# Patient Record
Sex: MALE | Race: WHITE | NOT HISPANIC OR LATINO | Employment: FULL TIME | URBAN - METROPOLITAN AREA
[De-identification: names, ages, dates, MRNs, and addresses within clinical notes are randomized per-mention and may not be internally consistent; named-entity substitution may affect disease eponyms.]

---

## 2018-01-01 ENCOUNTER — APPOINTMENT (OUTPATIENT)
Dept: RADIOLOGY | Facility: MEDICAL CENTER | Age: 63
DRG: 853 | End: 2018-01-01
Attending: INTERNAL MEDICINE
Payer: COMMERCIAL

## 2018-01-01 ENCOUNTER — HOSPITAL ENCOUNTER (INPATIENT)
Facility: MEDICAL CENTER | Age: 63
LOS: 3 days | DRG: 853 | End: 2018-02-18
Attending: EMERGENCY MEDICINE | Admitting: HOSPITALIST
Payer: COMMERCIAL

## 2018-01-01 ENCOUNTER — APPOINTMENT (OUTPATIENT)
Dept: RADIOLOGY | Facility: MEDICAL CENTER | Age: 63
DRG: 853 | End: 2018-01-01
Attending: EMERGENCY MEDICINE
Payer: COMMERCIAL

## 2018-01-01 VITALS
RESPIRATION RATE: 27 BRPM | DIASTOLIC BLOOD PRESSURE: 65 MMHG | HEIGHT: 73 IN | OXYGEN SATURATION: 97 % | WEIGHT: 211.86 LBS | TEMPERATURE: 98.1 F | BODY MASS INDEX: 28.08 KG/M2 | HEART RATE: 117 BPM | SYSTOLIC BLOOD PRESSURE: 134 MMHG

## 2018-01-01 DIAGNOSIS — N17.9 ACUTE KIDNEY INJURY (HCC): ICD-10-CM

## 2018-01-01 DIAGNOSIS — E87.5 HYPERKALEMIA: ICD-10-CM

## 2018-01-01 DIAGNOSIS — J96.01 ACUTE RESPIRATORY FAILURE WITH HYPOXIA (HCC): ICD-10-CM

## 2018-01-01 DIAGNOSIS — R41.82 ALTERED MENTAL STATUS, UNSPECIFIED ALTERED MENTAL STATUS TYPE: ICD-10-CM

## 2018-01-01 DIAGNOSIS — C34.92 MALIGNANT NEOPLASM OF LEFT LUNG, UNSPECIFIED PART OF LUNG (HCC): ICD-10-CM

## 2018-01-01 DIAGNOSIS — A41.9 SEPSIS, DUE TO UNSPECIFIED ORGANISM: ICD-10-CM

## 2018-01-01 LAB
ACTION RANGE TRIGGERED IACRT: NO
ACTION RANGE TRIGGERED IACRT: YES
ACTION RANGE TRIGGERED IACRT: YES
ALBUMIN SERPL BCP-MCNC: 3.3 G/DL (ref 3.2–4.9)
ALBUMIN/GLOB SERPL: 0.6 G/DL
ALP SERPL-CCNC: 198 U/L (ref 30–99)
ALT SERPL-CCNC: 32 U/L (ref 2–50)
AMPHET UR QL SCN: NEGATIVE
ANION GAP SERPL CALC-SCNC: 10 MMOL/L (ref 0–11.9)
ANION GAP SERPL CALC-SCNC: 11 MMOL/L (ref 0–11.9)
ANION GAP SERPL CALC-SCNC: 11 MMOL/L (ref 0–11.9)
ANION GAP SERPL CALC-SCNC: 15 MMOL/L (ref 0–11.9)
ANION GAP SERPL CALC-SCNC: 9 MMOL/L (ref 0–11.9)
ANISOCYTOSIS BLD QL SMEAR: ABNORMAL
APPEARANCE UR: CLEAR
AST SERPL-CCNC: 70 U/L (ref 12–45)
BACTERIA #/AREA URNS HPF: NEGATIVE /HPF
BACTERIA SPEC RESP CULT: ABNORMAL
BACTERIA SPEC RESP CULT: ABNORMAL
BACTERIA UR CULT: NORMAL
BARBITURATES UR QL SCN: NEGATIVE
BASE EXCESS BLDA CALC-SCNC: -13 MMOL/L (ref -4–3)
BASE EXCESS BLDA CALC-SCNC: -4 MMOL/L (ref -4–3)
BASE EXCESS BLDA CALC-SCNC: -6 MMOL/L (ref -4–3)
BASE EXCESS BLDA CALC-SCNC: -7 MMOL/L (ref -4–3)
BASE EXCESS BLDA CALC-SCNC: -9 MMOL/L (ref -4–3)
BASOPHILS # BLD AUTO: 0.2 % (ref 0–1.8)
BASOPHILS # BLD AUTO: 0.3 % (ref 0–1.8)
BASOPHILS # BLD AUTO: 0.4 % (ref 0–1.8)
BASOPHILS # BLD AUTO: 0.5 % (ref 0–1.8)
BASOPHILS # BLD AUTO: 0.5 % (ref 0–1.8)
BASOPHILS # BLD AUTO: 1.8 % (ref 0–1.8)
BASOPHILS # BLD: 0.03 K/UL (ref 0–0.12)
BASOPHILS # BLD: 0.03 K/UL (ref 0–0.12)
BASOPHILS # BLD: 0.04 K/UL (ref 0–0.12)
BASOPHILS # BLD: 0.06 K/UL (ref 0–0.12)
BASOPHILS # BLD: 0.06 K/UL (ref 0–0.12)
BASOPHILS # BLD: 0.27 K/UL (ref 0–0.12)
BENZODIAZ UR QL SCN: NEGATIVE
BILIRUB SERPL-MCNC: 0.4 MG/DL (ref 0.1–1.5)
BILIRUB UR QL STRIP.AUTO: NEGATIVE
BODY TEMPERATURE: ABNORMAL CENTIGRADE
BODY TEMPERATURE: ABNORMAL DEGREES
BUN SERPL-MCNC: 51 MG/DL (ref 8–22)
BUN SERPL-MCNC: 60 MG/DL (ref 8–22)
BUN SERPL-MCNC: 64 MG/DL (ref 8–22)
BUN SERPL-MCNC: 65 MG/DL (ref 8–22)
BUN SERPL-MCNC: 65 MG/DL (ref 8–22)
BURR CELLS BLD QL SMEAR: NORMAL
BZE UR QL SCN: NEGATIVE
CALCIUM SERPL-MCNC: 8.1 MG/DL (ref 8.5–10.5)
CALCIUM SERPL-MCNC: 9.2 MG/DL (ref 8.5–10.5)
CALCIUM SERPL-MCNC: 9.7 MG/DL (ref 8.5–10.5)
CANNABINOIDS UR QL SCN: POSITIVE
CFT BLD TEG: 5.5 MIN (ref 5–10)
CFT P HPASE BLD TEG: 5.9 MIN (ref 5–10)
CHLORIDE SERPL-SCNC: 103 MMOL/L (ref 96–112)
CHLORIDE SERPL-SCNC: 109 MMOL/L (ref 96–112)
CHLORIDE SERPL-SCNC: 110 MMOL/L (ref 96–112)
CHLORIDE SERPL-SCNC: 111 MMOL/L (ref 96–112)
CHLORIDE SERPL-SCNC: 113 MMOL/L (ref 96–112)
CHOLEST SERPL-MCNC: 121 MG/DL (ref 100–199)
CLOT ANGLE BLD TEG: 78.6 DEGREES (ref 53–72)
CLOT ANGLE P HPASE BLD TEG: 77 DEGREES (ref 53–72)
CLOT INIT P HPASE BLD TEG: 1 MIN (ref 1–3)
CLOT LYSIS 30M P MA LENFR BLD TEG: 0 % (ref 0–8)
CLOT LYSIS 30M P MA LENFR BLD TEG: 0 % (ref 0–8)
CO2 BLDA-SCNC: 16 MMOL/L (ref 20–33)
CO2 BLDA-SCNC: 18 MMOL/L (ref 20–33)
CO2 BLDA-SCNC: 19 MMOL/L (ref 20–33)
CO2 BLDA-SCNC: 21 MMOL/L (ref 20–33)
CO2 SERPL-SCNC: 15 MMOL/L (ref 20–33)
CO2 SERPL-SCNC: 17 MMOL/L (ref 20–33)
CO2 SERPL-SCNC: 18 MMOL/L (ref 20–33)
CO2 SERPL-SCNC: 18 MMOL/L (ref 20–33)
CO2 SERPL-SCNC: 19 MMOL/L (ref 20–33)
COLOR UR: YELLOW
CREAT SERPL-MCNC: 2.3 MG/DL (ref 0.5–1.4)
CREAT SERPL-MCNC: 2.69 MG/DL (ref 0.5–1.4)
CREAT SERPL-MCNC: 3.17 MG/DL (ref 0.5–1.4)
CREAT SERPL-MCNC: 3.49 MG/DL (ref 0.5–1.4)
CREAT SERPL-MCNC: 3.5 MG/DL (ref 0.5–1.4)
CREAT UR-MCNC: 146.2 MG/DL
CT.EXTRINSIC BLD ROTEM: 0.9 MIN (ref 1–3)
DACRYOCYTES BLD QL SMEAR: NORMAL
EKG IMPRESSION: NORMAL
EOSINOPHIL # BLD AUTO: 0.08 K/UL (ref 0–0.51)
EOSINOPHIL # BLD AUTO: 0.09 K/UL (ref 0–0.51)
EOSINOPHIL # BLD AUTO: 0.13 K/UL (ref 0–0.51)
EOSINOPHIL # BLD AUTO: 0.18 K/UL (ref 0–0.51)
EOSINOPHIL # BLD AUTO: 0.39 K/UL (ref 0–0.51)
EOSINOPHIL # BLD AUTO: 0.41 K/UL (ref 0–0.51)
EOSINOPHIL NFR BLD: 0.6 % (ref 0–6.9)
EOSINOPHIL NFR BLD: 0.6 % (ref 0–6.9)
EOSINOPHIL NFR BLD: 1.3 % (ref 0–6.9)
EOSINOPHIL NFR BLD: 1.8 % (ref 0–6.9)
EOSINOPHIL NFR BLD: 2.6 % (ref 0–6.9)
EOSINOPHIL NFR BLD: 3.6 % (ref 0–6.9)
EPI CELLS #/AREA URNS HPF: NEGATIVE /HPF
ERYTHROCYTE [DISTWIDTH] IN BLOOD BY AUTOMATED COUNT: 46.4 FL (ref 35.9–50)
ERYTHROCYTE [DISTWIDTH] IN BLOOD BY AUTOMATED COUNT: 48 FL (ref 35.9–50)
ERYTHROCYTE [DISTWIDTH] IN BLOOD BY AUTOMATED COUNT: 48.1 FL (ref 35.9–50)
ERYTHROCYTE [DISTWIDTH] IN BLOOD BY AUTOMATED COUNT: 48.2 FL (ref 35.9–50)
ERYTHROCYTE [DISTWIDTH] IN BLOOD BY AUTOMATED COUNT: 48.9 FL (ref 35.9–50)
ERYTHROCYTE [DISTWIDTH] IN BLOOD BY AUTOMATED COUNT: 49.1 FL (ref 35.9–50)
EST. AVERAGE GLUCOSE BLD GHB EST-MCNC: 146 MG/DL
GLOBULIN SER CALC-MCNC: 5.1 G/DL (ref 1.9–3.5)
GLUCOSE BLD-MCNC: 117 MG/DL (ref 65–99)
GLUCOSE BLD-MCNC: 130 MG/DL (ref 65–99)
GLUCOSE BLD-MCNC: 140 MG/DL (ref 65–99)
GLUCOSE BLD-MCNC: 146 MG/DL (ref 65–99)
GLUCOSE BLD-MCNC: 162 MG/DL (ref 65–99)
GLUCOSE BLD-MCNC: 173 MG/DL (ref 65–99)
GLUCOSE BLD-MCNC: 178 MG/DL (ref 65–99)
GLUCOSE BLD-MCNC: 212 MG/DL (ref 65–99)
GLUCOSE BLD-MCNC: 220 MG/DL (ref 65–99)
GLUCOSE SERPL-MCNC: 124 MG/DL (ref 65–99)
GLUCOSE SERPL-MCNC: 127 MG/DL (ref 65–99)
GLUCOSE SERPL-MCNC: 177 MG/DL (ref 65–99)
GLUCOSE SERPL-MCNC: 201 MG/DL (ref 65–99)
GLUCOSE SERPL-MCNC: 224 MG/DL (ref 65–99)
GLUCOSE UR STRIP.AUTO-MCNC: NEGATIVE MG/DL
GRAM STN SPEC: ABNORMAL
GRAM STN SPEC: NORMAL
GRAM STN SPEC: NORMAL
HBA1C MFR BLD: 6.7 % (ref 0–5.6)
HCO3 BLDA-SCNC: 14.8 MMOL/L (ref 17–25)
HCO3 BLDA-SCNC: 17.1 MMOL/L (ref 17–25)
HCO3 BLDA-SCNC: 17.7 MMOL/L (ref 17–25)
HCO3 BLDA-SCNC: 17.7 MMOL/L (ref 17–25)
HCO3 BLDA-SCNC: 18.5 MMOL/L (ref 17–25)
HCO3 BLDA-SCNC: 19 MMOL/L (ref 17–25)
HCO3 BLDA-SCNC: 20 MMOL/L (ref 17–25)
HCT VFR BLD AUTO: 26 % (ref 42–52)
HCT VFR BLD AUTO: 27.4 % (ref 42–52)
HCT VFR BLD AUTO: 27.7 % (ref 42–52)
HCT VFR BLD AUTO: 29.4 % (ref 42–52)
HCT VFR BLD AUTO: 35.2 % (ref 42–52)
HCT VFR BLD AUTO: 35.8 % (ref 42–52)
HDLC SERPL-MCNC: 17 MG/DL
HGB BLD-MCNC: 10.2 G/DL (ref 14–18)
HGB BLD-MCNC: 11.1 G/DL (ref 14–18)
HGB BLD-MCNC: 7.6 G/DL (ref 14–18)
HGB BLD-MCNC: 7.9 G/DL (ref 14–18)
HGB BLD-MCNC: 8.5 G/DL (ref 14–18)
HGB BLD-MCNC: 8.7 G/DL (ref 14–18)
HYALINE CASTS #/AREA URNS LPF: ABNORMAL /LPF
IMM GRANULOCYTES # BLD AUTO: 0.03 K/UL (ref 0–0.11)
IMM GRANULOCYTES # BLD AUTO: 0.06 K/UL (ref 0–0.11)
IMM GRANULOCYTES # BLD AUTO: 0.07 K/UL (ref 0–0.11)
IMM GRANULOCYTES # BLD AUTO: 0.08 K/UL (ref 0–0.11)
IMM GRANULOCYTES # BLD AUTO: 0.09 K/UL (ref 0–0.11)
IMM GRANULOCYTES NFR BLD AUTO: 0.3 % (ref 0–0.9)
IMM GRANULOCYTES NFR BLD AUTO: 0.5 % (ref 0–0.9)
IMM GRANULOCYTES NFR BLD AUTO: 0.5 % (ref 0–0.9)
IMM GRANULOCYTES NFR BLD AUTO: 0.6 % (ref 0–0.9)
IMM GRANULOCYTES NFR BLD AUTO: 0.8 % (ref 0–0.9)
INST. QUALIFIED PATIENT IIQPT: YES
KETONES UR STRIP.AUTO-MCNC: NEGATIVE MG/DL
LACTATE BLD-SCNC: 1.4 MMOL/L (ref 0.5–2)
LACTATE BLD-SCNC: 1.7 MMOL/L (ref 0.5–2)
LACTATE BLD-SCNC: 2.2 MMOL/L (ref 0.5–2)
LDLC SERPL CALC-MCNC: 69 MG/DL
LEUKOCYTE ESTERASE UR QL STRIP.AUTO: NEGATIVE
LYMPHOCYTES # BLD AUTO: 0.9 K/UL (ref 1–4.8)
LYMPHOCYTES # BLD AUTO: 0.94 K/UL (ref 1–4.8)
LYMPHOCYTES # BLD AUTO: 0.96 K/UL (ref 1–4.8)
LYMPHOCYTES # BLD AUTO: 1.14 K/UL (ref 1–4.8)
LYMPHOCYTES # BLD AUTO: 1.14 K/UL (ref 1–4.8)
LYMPHOCYTES # BLD AUTO: 1.83 K/UL (ref 1–4.8)
LYMPHOCYTES NFR BLD: 11.1 % (ref 22–41)
LYMPHOCYTES NFR BLD: 12.2 % (ref 22–41)
LYMPHOCYTES NFR BLD: 6.2 % (ref 22–41)
LYMPHOCYTES NFR BLD: 8.2 % (ref 22–41)
LYMPHOCYTES NFR BLD: 8.7 % (ref 22–41)
LYMPHOCYTES NFR BLD: 9.8 % (ref 22–41)
MAGNESIUM SERPL-MCNC: 1.9 MG/DL (ref 1.5–2.5)
MAGNESIUM SERPL-MCNC: 1.9 MG/DL (ref 1.5–2.5)
MAGNESIUM SERPL-MCNC: 2.1 MG/DL (ref 1.5–2.5)
MANUAL DIFF BLD: NORMAL
MCF BLD TEG: 86.8 MM (ref 50–70)
MCF P HPASE BLD TEG: 85.9 MM (ref 50–70)
MCH RBC QN AUTO: 23.4 PG (ref 27–33)
MCH RBC QN AUTO: 23.5 PG (ref 27–33)
MCH RBC QN AUTO: 23.7 PG (ref 27–33)
MCH RBC QN AUTO: 24.1 PG (ref 27–33)
MCH RBC QN AUTO: 24.2 PG (ref 27–33)
MCH RBC QN AUTO: 24.2 PG (ref 27–33)
MCHC RBC AUTO-ENTMCNC: 28.8 G/DL (ref 33.7–35.3)
MCHC RBC AUTO-ENTMCNC: 29.2 G/DL (ref 33.7–35.3)
MCHC RBC AUTO-ENTMCNC: 29.6 G/DL (ref 33.7–35.3)
MCHC RBC AUTO-ENTMCNC: 29.9 G/DL (ref 33.7–35.3)
MCHC RBC AUTO-ENTMCNC: 30.7 G/DL (ref 33.7–35.3)
MCHC RBC AUTO-ENTMCNC: 31 G/DL (ref 33.7–35.3)
MCV RBC AUTO: 77.8 FL (ref 81.4–97.8)
MCV RBC AUTO: 78.9 FL (ref 81.4–97.8)
MCV RBC AUTO: 80.1 FL (ref 81.4–97.8)
MCV RBC AUTO: 80.2 FL (ref 81.4–97.8)
MCV RBC AUTO: 80.9 FL (ref 81.4–97.8)
MCV RBC AUTO: 81.1 FL (ref 81.4–97.8)
METHADONE UR QL SCN: NEGATIVE
MICRO URNS: ABNORMAL
MICROCYTES BLD QL SMEAR: ABNORMAL
MONOCYTES # BLD AUTO: 0.78 K/UL (ref 0–0.85)
MONOCYTES # BLD AUTO: 0.83 K/UL (ref 0–0.85)
MONOCYTES # BLD AUTO: 0.84 K/UL (ref 0–0.85)
MONOCYTES # BLD AUTO: 0.86 K/UL (ref 0–0.85)
MONOCYTES # BLD AUTO: 0.86 K/UL (ref 0–0.85)
MONOCYTES # BLD AUTO: 0.9 K/UL (ref 0–0.85)
MONOCYTES NFR BLD AUTO: 5.2 % (ref 0–13.4)
MONOCYTES NFR BLD AUTO: 5.9 % (ref 0–13.4)
MONOCYTES NFR BLD AUTO: 6.6 % (ref 0–13.4)
MONOCYTES NFR BLD AUTO: 7.3 % (ref 0–13.4)
MONOCYTES NFR BLD AUTO: 8.5 % (ref 0–13.4)
MONOCYTES NFR BLD AUTO: 8.8 % (ref 0–13.4)
MORPHOLOGY BLD-IMP: NORMAL
NEUTROPHILS # BLD AUTO: 10.82 K/UL (ref 1.82–7.42)
NEUTROPHILS # BLD AUTO: 11.73 K/UL (ref 1.82–7.42)
NEUTROPHILS # BLD AUTO: 12.65 K/UL (ref 1.82–7.42)
NEUTROPHILS # BLD AUTO: 7.72 K/UL (ref 1.82–7.42)
NEUTROPHILS # BLD AUTO: 8.03 K/UL (ref 1.82–7.42)
NEUTROPHILS # BLD AUTO: 9.12 K/UL (ref 1.82–7.42)
NEUTROPHILS NFR BLD: 76.5 % (ref 44–72)
NEUTROPHILS NFR BLD: 78.1 % (ref 44–72)
NEUTROPHILS NFR BLD: 79 % (ref 44–72)
NEUTROPHILS NFR BLD: 79.6 % (ref 44–72)
NEUTROPHILS NFR BLD: 83.1 % (ref 44–72)
NEUTROPHILS NFR BLD: 86.6 % (ref 44–72)
NEUTS BAND NFR BLD MANUAL: 1.7 % (ref 0–10)
NITRITE UR QL STRIP.AUTO: NEGATIVE
NRBC # BLD AUTO: 0 K/UL
NRBC BLD-RTO: 0 /100 WBC
O2/TOTAL GAS SETTING VFR VENT: 100 %
O2/TOTAL GAS SETTING VFR VENT: 100 %
O2/TOTAL GAS SETTING VFR VENT: 40 %
O2/TOTAL GAS SETTING VFR VENT: 50 %
O2/TOTAL GAS SETTING VFR VENT: 80 %
O2/TOTAL GAS SETTING VFR VENT: 80 %
OPIATES UR QL SCN: POSITIVE
OXYCODONE UR QL SCN: POSITIVE
PCO2 BLDA: 28.6 MMHG (ref 26–37)
PCO2 BLDA: 30.8 MMHG (ref 26–37)
PCO2 BLDA: 31.5 MMHG (ref 26–37)
PCO2 BLDA: 34 MMHG (ref 26–37)
PCO2 BLDA: 36.1 MMHG (ref 26–37)
PCO2 BLDA: 38.6 MMHG (ref 26–37)
PCO2 BLDA: 38.8 MMHG (ref 26–37)
PCO2 TEMP ADJ BLDA: 28.3 MMHG (ref 26–37)
PCO2 TEMP ADJ BLDA: 31.3 MMHG (ref 26–37)
PCO2 TEMP ADJ BLDA: 33 MMHG (ref 26–37)
PCO2 TEMP ADJ BLDA: 36.6 MMHG (ref 26–37)
PCO2 TEMP ADJ BLDA: 41.7 MMHG (ref 26–37)
PCP UR QL SCN: NEGATIVE
PH BLDA: 7.19 [PH] (ref 7.4–7.5)
PH BLDA: 7.27 [PH] (ref 7.4–7.5)
PH BLDA: 7.34 [PH] (ref 7.4–7.5)
PH BLDA: 7.34 [PH] (ref 7.4–7.5)
PH BLDA: 7.37 [PH] (ref 7.4–7.5)
PH BLDA: 7.38 [PH] (ref 7.4–7.5)
PH BLDA: 7.41 [PH] (ref 7.4–7.5)
PH TEMP ADJ BLDA: 7.17 [PH] (ref 7.4–7.5)
PH TEMP ADJ BLDA: 7.32 [PH] (ref 7.4–7.5)
PH TEMP ADJ BLDA: 7.36 [PH] (ref 7.4–7.5)
PH TEMP ADJ BLDA: 7.39 [PH] (ref 7.4–7.5)
PH TEMP ADJ BLDA: 7.39 [PH] (ref 7.4–7.5)
PH UR STRIP.AUTO: 5 [PH]
PHOSPHATE SERPL-MCNC: 3.7 MG/DL (ref 2.5–4.5)
PHOSPHATE SERPL-MCNC: 4.5 MG/DL (ref 2.5–4.5)
PHOSPHATE SERPL-MCNC: 5.2 MG/DL (ref 2.5–4.5)
PLATELET # BLD AUTO: 202 K/UL (ref 164–446)
PLATELET # BLD AUTO: 204 K/UL (ref 164–446)
PLATELET # BLD AUTO: 208 K/UL (ref 164–446)
PLATELET # BLD AUTO: 232 K/UL (ref 164–446)
PLATELET # BLD AUTO: 282 K/UL (ref 164–446)
PLATELET # BLD AUTO: 305 K/UL (ref 164–446)
PMV BLD AUTO: 10.9 FL (ref 9–12.9)
PMV BLD AUTO: 11 FL (ref 9–12.9)
PMV BLD AUTO: 11.2 FL (ref 9–12.9)
PMV BLD AUTO: 11.3 FL (ref 9–12.9)
PMV BLD AUTO: 11.3 FL (ref 9–12.9)
PMV BLD AUTO: 11.4 FL (ref 9–12.9)
PO2 BLDA: 118 MMHG (ref 64–87)
PO2 BLDA: 123 MMHG (ref 64–87)
PO2 BLDA: 130 MMHG (ref 64–87)
PO2 BLDA: 175 MMHG (ref 64–87)
PO2 BLDA: 70 MMHG (ref 64–87)
PO2 BLDA: 78 MMHG (ref 64–87)
PO2 BLDA: 80.5 MMHG (ref 64–87)
PO2 TEMP ADJ BLDA: 128 MMHG (ref 64–87)
PO2 TEMP ADJ BLDA: 135 MMHG (ref 64–87)
PO2 TEMP ADJ BLDA: 177 MMHG (ref 64–87)
PO2 TEMP ADJ BLDA: 79 MMHG (ref 64–87)
PO2 TEMP ADJ BLDA: 84 MMHG (ref 64–87)
POIKILOCYTOSIS BLD QL SMEAR: NORMAL
POLYCHROMASIA BLD QL SMEAR: NORMAL
POTASSIUM SERPL-SCNC: 4.5 MMOL/L (ref 3.6–5.5)
POTASSIUM SERPL-SCNC: 4.8 MMOL/L (ref 3.6–5.5)
POTASSIUM SERPL-SCNC: 4.9 MMOL/L (ref 3.6–5.5)
POTASSIUM SERPL-SCNC: 5.3 MMOL/L (ref 3.6–5.5)
POTASSIUM SERPL-SCNC: 5.8 MMOL/L (ref 3.6–5.5)
PROCALCITONIN SERPL-MCNC: 0.55 NG/ML
PROPOXYPH UR QL SCN: NEGATIVE
PROT SERPL-MCNC: 8.4 G/DL (ref 6–8.2)
PROT UR QL STRIP: NEGATIVE MG/DL
RBC # BLD AUTO: 3.24 M/UL (ref 4.7–6.1)
RBC # BLD AUTO: 3.38 M/UL (ref 4.7–6.1)
RBC # BLD AUTO: 3.51 M/UL (ref 4.7–6.1)
RBC # BLD AUTO: 3.67 M/UL (ref 4.7–6.1)
RBC # BLD AUTO: 4.3 M/UL (ref 4.7–6.1)
RBC # BLD AUTO: 4.6 M/UL (ref 4.7–6.1)
RBC # URNS HPF: ABNORMAL /HPF
RBC BLD AUTO: PRESENT
RBC UR QL AUTO: ABNORMAL
RHODAMINE-AURAMINE STN SPEC: NORMAL
SALICYLATES SERPL-MCNC: 0 MG/DL (ref 15–25)
SAO2 % BLDA: 100 % (ref 93–99)
SAO2 % BLDA: 89 % (ref 93–99)
SAO2 % BLDA: 93.3 % (ref 93–99)
SAO2 % BLDA: 96 % (ref 93–99)
SAO2 % BLDA: 98 % (ref 93–99)
SAO2 % BLDA: 99 % (ref 93–99)
SAO2 % BLDA: 99 % (ref 93–99)
SIGNIFICANT IND 70042: ABNORMAL
SIGNIFICANT IND 70042: NORMAL
SITE SITE: ABNORMAL
SITE SITE: NORMAL
SODIUM SERPL-SCNC: 135 MMOL/L (ref 135–145)
SODIUM SERPL-SCNC: 136 MMOL/L (ref 135–145)
SODIUM SERPL-SCNC: 139 MMOL/L (ref 135–145)
SODIUM SERPL-SCNC: 139 MMOL/L (ref 135–145)
SODIUM SERPL-SCNC: 140 MMOL/L (ref 135–145)
SODIUM UR-SCNC: 49 MMOL/L
SOURCE SOURCE: ABNORMAL
SOURCE SOURCE: NORMAL
SP GR UR STRIP.AUTO: 1.02
SPECIMEN DRAWN FROM PATIENT: ABNORMAL
TEG ALGORITHM TGALG: ABNORMAL
TRIGL SERPL-MCNC: 168 MG/DL (ref 0–149)
TRIGL SERPL-MCNC: 175 MG/DL (ref 0–149)
UROBILINOGEN UR STRIP.AUTO-MCNC: 1 MG/DL
VANCOMYCIN SERPL-MCNC: 13.8 UG/ML
VANCOMYCIN SERPL-MCNC: 18.9 UG/ML
WBC # BLD AUTO: 10.3 K/UL (ref 4.8–10.8)
WBC # BLD AUTO: 11.5 K/UL (ref 4.8–10.8)
WBC # BLD AUTO: 13 K/UL (ref 4.8–10.8)
WBC # BLD AUTO: 14.6 K/UL (ref 4.8–10.8)
WBC # BLD AUTO: 15 K/UL (ref 4.8–10.8)
WBC # BLD AUTO: 9.8 K/UL (ref 4.8–10.8)
WBC #/AREA URNS HPF: ABNORMAL /HPF

## 2018-01-01 PROCEDURE — 96366 THER/PROPH/DIAG IV INF ADDON: CPT

## 2018-01-01 PROCEDURE — 36600 WITHDRAWAL OF ARTERIAL BLOOD: CPT

## 2018-01-01 PROCEDURE — 303105 HCHG CATHETER EXTRA

## 2018-01-01 PROCEDURE — 70450 CT HEAD/BRAIN W/O DYE: CPT

## 2018-01-01 PROCEDURE — 71045 X-RAY EXAM CHEST 1 VIEW: CPT

## 2018-01-01 PROCEDURE — 85025 COMPLETE CBC W/AUTO DIFF WBC: CPT

## 2018-01-01 PROCEDURE — 96375 TX/PRO/DX INJ NEW DRUG ADDON: CPT

## 2018-01-01 PROCEDURE — 93005 ELECTROCARDIOGRAM TRACING: CPT | Performed by: EMERGENCY MEDICINE

## 2018-01-01 PROCEDURE — 700111 HCHG RX REV CODE 636 W/ 250 OVERRIDE (IP): Performed by: INTERNAL MEDICINE

## 2018-01-01 PROCEDURE — 82962 GLUCOSE BLOOD TEST: CPT

## 2018-01-01 PROCEDURE — 83735 ASSAY OF MAGNESIUM: CPT

## 2018-01-01 PROCEDURE — 87102 FUNGUS ISOLATION CULTURE: CPT

## 2018-01-01 PROCEDURE — A9270 NON-COVERED ITEM OR SERVICE: HCPCS | Performed by: INTERNAL MEDICINE

## 2018-01-01 PROCEDURE — 51702 INSERT TEMP BLADDER CATH: CPT

## 2018-01-01 PROCEDURE — 96368 THER/DIAG CONCURRENT INF: CPT

## 2018-01-01 PROCEDURE — 700111 HCHG RX REV CODE 636 W/ 250 OVERRIDE (IP): Performed by: PHARMACIST

## 2018-01-01 PROCEDURE — 700105 HCHG RX REV CODE 258: Performed by: INTERNAL MEDICINE

## 2018-01-01 PROCEDURE — 85027 COMPLETE CBC AUTOMATED: CPT

## 2018-01-01 PROCEDURE — B548ZZA ULTRASONOGRAPHY OF SUPERIOR VENA CAVA, GUIDANCE: ICD-10-PCS | Performed by: INTERNAL MEDICINE

## 2018-01-01 PROCEDURE — 0B938ZZ DRAINAGE OF RIGHT MAIN BRONCHUS, VIA NATURAL OR ARTIFICIAL OPENING ENDOSCOPIC: ICD-10-PCS | Performed by: INTERNAL MEDICINE

## 2018-01-01 PROCEDURE — 94003 VENT MGMT INPAT SUBQ DAY: CPT

## 2018-01-01 PROCEDURE — 87205 SMEAR GRAM STAIN: CPT

## 2018-01-01 PROCEDURE — 93005 ELECTROCARDIOGRAM TRACING: CPT | Performed by: INTERNAL MEDICINE

## 2018-01-01 PROCEDURE — 80307 DRUG TEST PRSMV CHEM ANLYZR: CPT

## 2018-01-01 PROCEDURE — 770022 HCHG ROOM/CARE - ICU (200)

## 2018-01-01 PROCEDURE — 87070 CULTURE OTHR SPECIMN AEROBIC: CPT

## 2018-01-01 PROCEDURE — 80048 BASIC METABOLIC PNL TOTAL CA: CPT

## 2018-01-01 PROCEDURE — 74018 RADEX ABDOMEN 1 VIEW: CPT

## 2018-01-01 PROCEDURE — 0BH17EZ INSERTION OF ENDOTRACHEAL AIRWAY INTO TRACHEA, VIA NATURAL OR ARTIFICIAL OPENING: ICD-10-PCS | Performed by: EMERGENCY MEDICINE

## 2018-01-01 PROCEDURE — 96365 THER/PROPH/DIAG IV INF INIT: CPT

## 2018-01-01 PROCEDURE — 96361 HYDRATE IV INFUSION ADD-ON: CPT

## 2018-01-01 PROCEDURE — 84100 ASSAY OF PHOSPHORUS: CPT

## 2018-01-01 PROCEDURE — 700102 HCHG RX REV CODE 250 W/ 637 OVERRIDE(OP): Performed by: INTERNAL MEDICINE

## 2018-01-01 PROCEDURE — 99291 CRITICAL CARE FIRST HOUR: CPT

## 2018-01-01 PROCEDURE — 88305 TISSUE EXAM BY PATHOLOGIST: CPT

## 2018-01-01 PROCEDURE — 0B9G8ZX DRAINAGE OF LEFT UPPER LUNG LOBE, VIA NATURAL OR ARTIFICIAL OPENING ENDOSCOPIC, DIAGNOSTIC: ICD-10-PCS | Performed by: INTERNAL MEDICINE

## 2018-01-01 PROCEDURE — 82570 ASSAY OF URINE CREATININE: CPT

## 2018-01-01 PROCEDURE — 85007 BL SMEAR W/DIFF WBC COUNT: CPT

## 2018-01-01 PROCEDURE — 87116 MYCOBACTERIA CULTURE: CPT

## 2018-01-01 PROCEDURE — 74176 CT ABD & PELVIS W/O CONTRAST: CPT

## 2018-01-01 PROCEDURE — C1751 CATH, INF, PER/CENT/MIDLINE: HCPCS

## 2018-01-01 PROCEDURE — 700101 HCHG RX REV CODE 250: Performed by: INTERNAL MEDICINE

## 2018-01-01 PROCEDURE — 99152 MOD SED SAME PHYS/QHP 5/>YRS: CPT

## 2018-01-01 PROCEDURE — 82803 BLOOD GASES ANY COMBINATION: CPT

## 2018-01-01 PROCEDURE — 93010 ELECTROCARDIOGRAM REPORT: CPT | Performed by: INTERNAL MEDICINE

## 2018-01-01 PROCEDURE — 302152 K-PAD 12X17: Performed by: INTERNAL MEDICINE

## 2018-01-01 PROCEDURE — 94640 AIRWAY INHALATION TREATMENT: CPT

## 2018-01-01 PROCEDURE — 95819 EEG AWAKE AND ASLEEP: CPT

## 2018-01-01 PROCEDURE — 81001 URINALYSIS AUTO W/SCOPE: CPT

## 2018-01-01 PROCEDURE — 87106 FUNGI IDENTIFICATION YEAST: CPT

## 2018-01-01 PROCEDURE — 302131 K PAD MOTOR: Performed by: INTERNAL MEDICINE

## 2018-01-01 PROCEDURE — 87186 SC STD MICRODIL/AGAR DIL: CPT

## 2018-01-01 PROCEDURE — 36556 INSERT NON-TUNNEL CV CATH: CPT

## 2018-01-01 PROCEDURE — 700105 HCHG RX REV CODE 258: Performed by: EMERGENCY MEDICINE

## 2018-01-01 PROCEDURE — 88112 CYTOPATH CELL ENHANCE TECH: CPT

## 2018-01-01 PROCEDURE — 85576 BLOOD PLATELET AGGREGATION: CPT | Mod: 91

## 2018-01-01 PROCEDURE — 302214 INTUBATION BOX: Performed by: EMERGENCY MEDICINE

## 2018-01-01 PROCEDURE — 96367 TX/PROPH/DG ADDL SEQ IV INF: CPT

## 2018-01-01 PROCEDURE — 84478 ASSAY OF TRIGLYCERIDES: CPT

## 2018-01-01 PROCEDURE — 304538 HCHG NG TUBE

## 2018-01-01 PROCEDURE — 83605 ASSAY OF LACTIC ACID: CPT

## 2018-01-01 PROCEDURE — 5A1945Z RESPIRATORY VENTILATION, 24-96 CONSECUTIVE HOURS: ICD-10-PCS | Performed by: INTERNAL MEDICINE

## 2018-01-01 PROCEDURE — 85347 COAGULATION TIME ACTIVATED: CPT | Mod: 91

## 2018-01-01 PROCEDURE — 31500 INSERT EMERGENCY AIRWAY: CPT

## 2018-01-01 PROCEDURE — 85384 FIBRINOGEN ACTIVITY: CPT | Mod: 91

## 2018-01-01 PROCEDURE — 87086 URINE CULTURE/COLONY COUNT: CPT

## 2018-01-01 PROCEDURE — 84145 PROCALCITONIN (PCT): CPT

## 2018-01-01 PROCEDURE — 302136 NUTRITION PUMP: Performed by: INTERNAL MEDICINE

## 2018-01-01 PROCEDURE — 80053 COMPREHEN METABOLIC PANEL: CPT

## 2018-01-01 PROCEDURE — 94002 VENT MGMT INPAT INIT DAY: CPT

## 2018-01-01 PROCEDURE — 83605 ASSAY OF LACTIC ACID: CPT | Mod: 91

## 2018-01-01 PROCEDURE — 84300 ASSAY OF URINE SODIUM: CPT

## 2018-01-01 PROCEDURE — 80061 LIPID PANEL: CPT

## 2018-01-01 PROCEDURE — 87015 SPECIMEN INFECT AGNT CONCNTJ: CPT

## 2018-01-01 PROCEDURE — 87077 CULTURE AEROBIC IDENTIFY: CPT

## 2018-01-01 PROCEDURE — 87040 BLOOD CULTURE FOR BACTERIA: CPT | Mod: 91

## 2018-01-01 PROCEDURE — 700111 HCHG RX REV CODE 636 W/ 250 OVERRIDE (IP)

## 2018-01-01 PROCEDURE — 80202 ASSAY OF VANCOMYCIN: CPT

## 2018-01-01 PROCEDURE — 82962 GLUCOSE BLOOD TEST: CPT | Mod: 91

## 2018-01-01 PROCEDURE — 83036 HEMOGLOBIN GLYCOSYLATED A1C: CPT

## 2018-01-01 PROCEDURE — 02HV33Z INSERTION OF INFUSION DEVICE INTO SUPERIOR VENA CAVA, PERCUTANEOUS APPROACH: ICD-10-PCS | Performed by: INTERNAL MEDICINE

## 2018-01-01 PROCEDURE — 700111 HCHG RX REV CODE 636 W/ 250 OVERRIDE (IP): Performed by: EMERGENCY MEDICINE

## 2018-01-01 PROCEDURE — 99292 CRITICAL CARE ADDL 30 MIN: CPT

## 2018-01-01 PROCEDURE — 87206 SMEAR FLUORESCENT/ACID STAI: CPT

## 2018-01-01 PROCEDURE — 302978 HCHG BRONCHOSCOPY-DIAGNOSTIC

## 2018-01-01 PROCEDURE — 0B978ZZ DRAINAGE OF LEFT MAIN BRONCHUS, VIA NATURAL OR ARTIFICIAL OPENING ENDOSCOPIC: ICD-10-PCS | Performed by: INTERNAL MEDICINE

## 2018-01-01 PROCEDURE — 302132 K THERMIA MOTOR: Performed by: INTERNAL MEDICINE

## 2018-01-01 RX ORDER — DEXTROSE MONOHYDRATE 25 G/50ML
25 INJECTION, SOLUTION INTRAVENOUS
Status: DISCONTINUED | OUTPATIENT
Start: 2018-01-01 | End: 2018-01-01

## 2018-01-01 RX ORDER — ESOMEPRAZOLE MAGNESIUM 40 MG/1
40 CAPSULE, DELAYED RELEASE ORAL 2 TIMES DAILY
COMMUNITY

## 2018-01-01 RX ORDER — DIPHENHYDRAMINE HCL 50 MG
50-100 CAPSULE ORAL NIGHTLY PRN
COMMUNITY

## 2018-01-01 RX ORDER — IPRATROPIUM BROMIDE AND ALBUTEROL SULFATE 2.5; .5 MG/3ML; MG/3ML
3 SOLUTION RESPIRATORY (INHALATION)
Status: DISCONTINUED | OUTPATIENT
Start: 2018-01-01 | End: 2018-01-01

## 2018-01-01 RX ORDER — MIDAZOLAM HYDROCHLORIDE 1 MG/ML
INJECTION INTRAMUSCULAR; INTRAVENOUS
Status: COMPLETED
Start: 2018-01-01 | End: 2018-01-01

## 2018-01-01 RX ORDER — FUROSEMIDE 20 MG/1
20 TABLET ORAL
Status: DISCONTINUED | OUTPATIENT
Start: 2018-01-01 | End: 2018-01-01

## 2018-01-01 RX ORDER — AMOXICILLIN 250 MG
2 CAPSULE ORAL 2 TIMES DAILY
Status: DISCONTINUED | OUTPATIENT
Start: 2018-01-01 | End: 2018-01-01

## 2018-01-01 RX ORDER — SODIUM CHLORIDE 9 MG/ML
INJECTION, SOLUTION INTRAVENOUS CONTINUOUS
Status: DISCONTINUED | OUTPATIENT
Start: 2018-01-01 | End: 2018-01-01

## 2018-01-01 RX ORDER — BISACODYL 10 MG
10 SUPPOSITORY, RECTAL RECTAL
Status: DISCONTINUED | OUTPATIENT
Start: 2018-01-01 | End: 2018-01-01

## 2018-01-01 RX ORDER — SODIUM CHLORIDE 9 MG/ML
30 INJECTION, SOLUTION INTRAVENOUS ONCE
Status: COMPLETED | OUTPATIENT
Start: 2018-01-01 | End: 2018-01-01

## 2018-01-01 RX ORDER — LORAZEPAM 2 MG/ML
1 INJECTION INTRAMUSCULAR
Status: DISCONTINUED | OUTPATIENT
Start: 2018-01-01 | End: 2018-01-01

## 2018-01-01 RX ORDER — PROPOFOL 10 MG/ML
100 INJECTION, EMULSION INTRAVENOUS ONCE
Status: COMPLETED | OUTPATIENT
Start: 2018-01-01 | End: 2018-01-01

## 2018-01-01 RX ORDER — CEFUROXIME AXETIL 500 MG/1
500 TABLET ORAL 2 TIMES DAILY
COMMUNITY
Start: 2018-01-01

## 2018-01-01 RX ORDER — POLYETHYLENE GLYCOL 3350 17 G/17G
1 POWDER, FOR SOLUTION ORAL
Status: DISCONTINUED | OUTPATIENT
Start: 2018-01-01 | End: 2018-01-01

## 2018-01-01 RX ORDER — CYANOCOBALAMIN 1000 UG/ML
1000 INJECTION, SOLUTION INTRAMUSCULAR; SUBCUTANEOUS
COMMUNITY

## 2018-01-01 RX ORDER — POLYVINYL ALCOHOL 14 MG/ML
2 SOLUTION/ DROPS OPHTHALMIC EVERY 6 HOURS PRN
Status: DISCONTINUED | OUTPATIENT
Start: 2018-01-01 | End: 2018-01-01

## 2018-01-01 RX ORDER — POLYVINYL ALCOHOL 14 MG/ML
1 SOLUTION/ DROPS OPHTHALMIC PRN
COMMUNITY

## 2018-01-01 RX ORDER — SUCCINYLCHOLINE CHLORIDE 20 MG/ML
100 INJECTION INTRAMUSCULAR; INTRAVENOUS ONCE
Status: COMPLETED | OUTPATIENT
Start: 2018-01-01 | End: 2018-01-01

## 2018-01-01 RX ORDER — FLUTICASONE PROPIONATE 110 UG/1
2 AEROSOL, METERED RESPIRATORY (INHALATION) 2 TIMES DAILY
COMMUNITY

## 2018-01-01 RX ORDER — PREGABALIN 300 MG/1
300 CAPSULE ORAL 2 TIMES DAILY
COMMUNITY

## 2018-01-01 RX ORDER — HEPARIN SODIUM 5000 [USP'U]/ML
5000 INJECTION, SOLUTION INTRAVENOUS; SUBCUTANEOUS EVERY 8 HOURS
Status: DISCONTINUED | OUTPATIENT
Start: 2018-01-01 | End: 2018-01-01

## 2018-01-01 RX ORDER — ACETAMINOPHEN 325 MG/1
650 TABLET ORAL EVERY 4 HOURS PRN
Status: DISCONTINUED | OUTPATIENT
Start: 2018-01-01 | End: 2018-01-01

## 2018-01-01 RX ORDER — CHLORHEXIDINE GLUCONATE ORAL RINSE 1.2 MG/ML
15 SOLUTION DENTAL 2 TIMES DAILY
Status: DISCONTINUED | OUTPATIENT
Start: 2018-01-01 | End: 2018-01-01

## 2018-01-01 RX ORDER — DICLOFENAC SODIUM 25 MG/1
25 TABLET, DELAYED RELEASE ORAL PRN
COMMUNITY

## 2018-01-01 RX ORDER — LISINOPRIL 20 MG/1
20 TABLET ORAL 2 TIMES DAILY
COMMUNITY

## 2018-01-01 RX ORDER — SODIUM CHLORIDE 9 MG/ML
1000 INJECTION, SOLUTION INTRAVENOUS ONCE
Status: COMPLETED | OUTPATIENT
Start: 2018-01-01 | End: 2018-01-01

## 2018-01-01 RX ORDER — LIDOCAINE HYDROCHLORIDE 10 MG/ML
1-2 INJECTION, SOLUTION INFILTRATION; PERINEURAL
Status: DISCONTINUED | OUTPATIENT
Start: 2018-01-01 | End: 2018-01-01

## 2018-01-01 RX ORDER — INSULIN GLARGINE 100 [IU]/ML
60 INJECTION, SOLUTION SUBCUTANEOUS NIGHTLY
COMMUNITY

## 2018-01-01 RX ORDER — FAMOTIDINE 20 MG/1
20 TABLET, FILM COATED ORAL DAILY
Status: DISCONTINUED | OUTPATIENT
Start: 2018-01-01 | End: 2018-01-01

## 2018-01-01 RX ORDER — LABETALOL HYDROCHLORIDE 5 MG/ML
10 INJECTION, SOLUTION INTRAVENOUS EVERY 4 HOURS PRN
Status: DISCONTINUED | OUTPATIENT
Start: 2018-01-01 | End: 2018-01-01

## 2018-01-01 RX ORDER — LORAZEPAM 2 MG/ML
1 CONCENTRATE ORAL
Status: DISCONTINUED | OUTPATIENT
Start: 2018-01-01 | End: 2018-01-01

## 2018-01-01 RX ORDER — GEMFIBROZIL 600 MG/1
600 TABLET, FILM COATED ORAL 2 TIMES DAILY
COMMUNITY

## 2018-01-01 RX ORDER — ATROPINE SULFATE 10 MG/ML
2 SOLUTION/ DROPS OPHTHALMIC EVERY 4 HOURS PRN
Status: DISCONTINUED | OUTPATIENT
Start: 2018-01-01 | End: 2018-01-01 | Stop reason: HOSPADM

## 2018-01-01 RX ORDER — VECURONIUM BROMIDE 1 MG/ML
0.1 INJECTION, POWDER, LYOPHILIZED, FOR SOLUTION INTRAVENOUS
Status: DISCONTINUED | OUTPATIENT
Start: 2018-01-01 | End: 2018-01-01

## 2018-01-01 RX ORDER — LORAZEPAM 2 MG/ML
1 INJECTION INTRAMUSCULAR
Status: DISCONTINUED | OUTPATIENT
Start: 2018-01-01 | End: 2018-01-01 | Stop reason: HOSPADM

## 2018-01-01 RX ORDER — NALOXONE HYDROCHLORIDE 0.4 MG/ML
0.4 INJECTION, SOLUTION INTRAMUSCULAR; INTRAVENOUS; SUBCUTANEOUS ONCE
Status: COMPLETED | OUTPATIENT
Start: 2018-01-01 | End: 2018-01-01

## 2018-01-01 RX ORDER — ATROPINE SULFATE 10 MG/ML
2 SOLUTION/ DROPS OPHTHALMIC EVERY 4 HOURS PRN
Status: DISCONTINUED | OUTPATIENT
Start: 2018-01-01 | End: 2018-01-01

## 2018-01-01 RX ORDER — GLIPIZIDE 10 MG/1
20 TABLET ORAL EVERY MORNING
COMMUNITY

## 2018-01-01 RX ORDER — LORAZEPAM 2 MG/ML
1 CONCENTRATE ORAL
Status: DISCONTINUED | OUTPATIENT
Start: 2018-01-01 | End: 2018-01-01 | Stop reason: HOSPADM

## 2018-01-01 RX ORDER — MIRTAZAPINE 30 MG/1
15-45 TABLET, FILM COATED ORAL NIGHTLY
COMMUNITY

## 2018-01-01 RX ORDER — OXYCODONE HYDROCHLORIDE 5 MG/1
5 TABLET ORAL EVERY 4 HOURS PRN
COMMUNITY

## 2018-01-01 RX ORDER — VECURONIUM BROMIDE 1 MG/ML
0.1 INJECTION, POWDER, LYOPHILIZED, FOR SOLUTION INTRAVENOUS ONCE
Status: DISCONTINUED | OUTPATIENT
Start: 2018-01-01 | End: 2018-01-01

## 2018-01-01 RX ORDER — AMOXICILLIN 250 MG
1 CAPSULE ORAL
COMMUNITY

## 2018-01-01 RX ORDER — DILTIAZEM HYDROCHLORIDE 120 MG/1
120 CAPSULE, COATED, EXTENDED RELEASE ORAL DAILY
COMMUNITY

## 2018-01-01 RX ORDER — ALBUTEROL SULFATE 90 UG/1
1-2 AEROSOL, METERED RESPIRATORY (INHALATION) EVERY 4 HOURS PRN
COMMUNITY

## 2018-01-01 RX ORDER — METRONIDAZOLE 500 MG/1
500 TABLET ORAL EVERY 8 HOURS
Status: DISCONTINUED | OUTPATIENT
Start: 2018-01-01 | End: 2018-01-01

## 2018-01-01 RX ORDER — ALBUTEROL SULFATE 2.5 MG/3ML
2.5 SOLUTION RESPIRATORY (INHALATION) EVERY 4 HOURS PRN
COMMUNITY

## 2018-01-01 RX ORDER — CLARITHROMYCIN 500 MG/1
500 TABLET, COATED ORAL 2 TIMES DAILY
COMMUNITY
Start: 2018-01-01

## 2018-01-01 RX ADMIN — FAMOTIDINE 20 MG: 20 TABLET, FILM COATED ORAL at 07:52

## 2018-01-01 RX ADMIN — METRONIDAZOLE 500 MG: 500 INJECTION, SOLUTION INTRAVENOUS at 14:53

## 2018-01-01 RX ADMIN — FUROSEMIDE 20 MG: 20 TABLET ORAL at 05:47

## 2018-01-01 RX ADMIN — IPRATROPIUM BROMIDE AND ALBUTEROL SULFATE 3 ML: .5; 3 SOLUTION RESPIRATORY (INHALATION) at 11:17

## 2018-01-01 RX ADMIN — FAMOTIDINE 20 MG: 20 TABLET, FILM COATED ORAL at 07:37

## 2018-01-01 RX ADMIN — FAMOTIDINE 20 MG: 10 INJECTION, SOLUTION INTRAVENOUS at 08:22

## 2018-01-01 RX ADMIN — HEPARIN SODIUM 5000 UNITS: 5000 INJECTION, SOLUTION INTRAVENOUS; SUBCUTANEOUS at 01:15

## 2018-01-01 RX ADMIN — IPRATROPIUM BROMIDE AND ALBUTEROL SULFATE 3 ML: .5; 3 SOLUTION RESPIRATORY (INHALATION) at 23:22

## 2018-01-01 RX ADMIN — METRONIDAZOLE 500 MG: 500 INJECTION, SOLUTION INTRAVENOUS at 14:28

## 2018-01-01 RX ADMIN — CEFEPIME 2 G: 2 INJECTION, POWDER, FOR SOLUTION INTRAMUSCULAR; INTRAVENOUS at 22:39

## 2018-01-01 RX ADMIN — FENTANYL CITRATE 100 MCG: 50 INJECTION, SOLUTION INTRAMUSCULAR; INTRAVENOUS at 14:10

## 2018-01-01 RX ADMIN — SODIUM CHLORIDE: 9 INJECTION, SOLUTION INTRAVENOUS at 22:40

## 2018-01-01 RX ADMIN — STANDARDIZED SENNA CONCENTRATE AND DOCUSATE SODIUM 2 TABLET: 8.6; 5 TABLET, FILM COATED ORAL at 07:52

## 2018-01-01 RX ADMIN — IPRATROPIUM BROMIDE AND ALBUTEROL SULFATE 3 ML: .5; 3 SOLUTION RESPIRATORY (INHALATION) at 03:12

## 2018-01-01 RX ADMIN — FENTANYL CITRATE 50 MCG: 50 INJECTION, SOLUTION INTRAMUSCULAR; INTRAVENOUS at 00:42

## 2018-01-01 RX ADMIN — SODIUM CHLORIDE: 9 INJECTION, SOLUTION INTRAVENOUS at 01:18

## 2018-01-01 RX ADMIN — METRONIDAZOLE 500 MG: 500 INJECTION, SOLUTION INTRAVENOUS at 21:08

## 2018-01-01 RX ADMIN — FUROSEMIDE 20 MG: 20 TABLET ORAL at 06:04

## 2018-01-01 RX ADMIN — IPRATROPIUM BROMIDE AND ALBUTEROL SULFATE 3 ML: .5; 3 SOLUTION RESPIRATORY (INHALATION) at 22:24

## 2018-01-01 RX ADMIN — HEPARIN SODIUM 5000 UNITS: 5000 INJECTION, SOLUTION INTRAVENOUS; SUBCUTANEOUS at 21:07

## 2018-01-01 RX ADMIN — IPRATROPIUM BROMIDE AND ALBUTEROL SULFATE 3 ML: .5; 3 SOLUTION RESPIRATORY (INHALATION) at 07:01

## 2018-01-01 RX ADMIN — ACETAMINOPHEN 650 MG: 325 TABLET, FILM COATED ORAL at 21:05

## 2018-01-01 RX ADMIN — CEFEPIME 2 G: 2 INJECTION, POWDER, FOR SOLUTION INTRAMUSCULAR; INTRAVENOUS at 21:03

## 2018-01-01 RX ADMIN — CEFEPIME 2 G: 2 INJECTION, POWDER, FOR SOLUTION INTRAMUSCULAR; INTRAVENOUS at 07:52

## 2018-01-01 RX ADMIN — IPRATROPIUM BROMIDE AND ALBUTEROL SULFATE 3 ML: .5; 3 SOLUTION RESPIRATORY (INHALATION) at 01:06

## 2018-01-01 RX ADMIN — FUROSEMIDE 20 MG: 20 TABLET ORAL at 14:29

## 2018-01-01 RX ADMIN — IPRATROPIUM BROMIDE AND ALBUTEROL SULFATE 3 ML: .5; 3 SOLUTION RESPIRATORY (INHALATION) at 19:04

## 2018-01-01 RX ADMIN — LORAZEPAM 2 MG: 2 INJECTION INTRAMUSCULAR; INTRAVENOUS at 13:55

## 2018-01-01 RX ADMIN — PROPOFOL 100 MG: 10 INJECTION, EMULSION INTRAVENOUS at 20:46

## 2018-01-01 RX ADMIN — METRONIDAZOLE 500 MG: 500 INJECTION, SOLUTION INTRAVENOUS at 05:47

## 2018-01-01 RX ADMIN — IPRATROPIUM BROMIDE AND ALBUTEROL SULFATE 3 ML: .5; 3 SOLUTION RESPIRATORY (INHALATION) at 02:25

## 2018-01-01 RX ADMIN — IPRATROPIUM BROMIDE AND ALBUTEROL SULFATE 3 ML: .5; 3 SOLUTION RESPIRATORY (INHALATION) at 11:06

## 2018-01-01 RX ADMIN — PHENYLEPHRINE HYDROCHLORIDE 25 MCG/MIN: 10 INJECTION INTRAVENOUS at 02:42

## 2018-01-01 RX ADMIN — FENTANYL CITRATE 100 MCG: 50 INJECTION, SOLUTION INTRAMUSCULAR; INTRAVENOUS at 13:00

## 2018-01-01 RX ADMIN — LORAZEPAM 2 MG: 2 INJECTION INTRAMUSCULAR; INTRAVENOUS at 13:44

## 2018-01-01 RX ADMIN — LORAZEPAM 2 MG: 2 INJECTION INTRAMUSCULAR; INTRAVENOUS at 19:27

## 2018-01-01 RX ADMIN — DEXMEDETOMIDINE HYDROCHLORIDE 0.1 MCG/KG/HR: 100 INJECTION, SOLUTION INTRAVENOUS at 01:07

## 2018-01-01 RX ADMIN — VANCOMYCIN HYDROCHLORIDE 1500 MG: 100 INJECTION, POWDER, LYOPHILIZED, FOR SOLUTION INTRAVENOUS at 05:47

## 2018-01-01 RX ADMIN — MIDAZOLAM 5 MG: 1 INJECTION INTRAMUSCULAR; INTRAVENOUS at 13:45

## 2018-01-01 RX ADMIN — METRONIDAZOLE 500 MG: 500 INJECTION, SOLUTION INTRAVENOUS at 05:29

## 2018-01-01 RX ADMIN — CHLORHEXIDINE GLUCONATE 15 ML: 1.2 RINSE ORAL at 07:37

## 2018-01-01 RX ADMIN — FENTANYL CITRATE 100 MCG: 50 INJECTION, SOLUTION INTRAMUSCULAR; INTRAVENOUS at 23:28

## 2018-01-01 RX ADMIN — CHLORHEXIDINE GLUCONATE 15 ML: 1.2 RINSE ORAL at 01:08

## 2018-01-01 RX ADMIN — HEPARIN SODIUM 5000 UNITS: 5000 INJECTION, SOLUTION INTRAVENOUS; SUBCUTANEOUS at 14:57

## 2018-01-01 RX ADMIN — PROPOFOL 40 MCG/KG/MIN: 10 INJECTION, EMULSION INTRAVENOUS at 21:57

## 2018-01-01 RX ADMIN — SUCCINYLCHOLINE CHLORIDE 100 MG: 20 INJECTION, SOLUTION INTRAMUSCULAR; INTRAVENOUS at 20:47

## 2018-01-01 RX ADMIN — CHLORHEXIDINE GLUCONATE 15 ML: 1.2 RINSE ORAL at 07:52

## 2018-01-01 RX ADMIN — POLYETHYLENE GLYCOL 3350 1 PACKET: 17 POWDER, FOR SOLUTION ORAL at 21:05

## 2018-01-01 RX ADMIN — CHLORHEXIDINE GLUCONATE 15 ML: 1.2 RINSE ORAL at 22:39

## 2018-01-01 RX ADMIN — PROPOFOL 55 MCG/KG/MIN: 10 INJECTION, EMULSION INTRAVENOUS at 23:53

## 2018-01-01 RX ADMIN — IPRATROPIUM BROMIDE AND ALBUTEROL SULFATE 3 ML: .5; 3 SOLUTION RESPIRATORY (INHALATION) at 07:21

## 2018-01-01 RX ADMIN — METRONIDAZOLE 500 MG: 500 INJECTION, SOLUTION INTRAVENOUS at 01:07

## 2018-01-01 RX ADMIN — HEPARIN SODIUM 5000 UNITS: 5000 INJECTION, SOLUTION INTRAVENOUS; SUBCUTANEOUS at 14:29

## 2018-01-01 RX ADMIN — FENTANYL CITRATE 100 MCG: 50 INJECTION, SOLUTION INTRAMUSCULAR; INTRAVENOUS at 15:30

## 2018-01-01 RX ADMIN — SODIUM CHLORIDE 1000 ML: 9 INJECTION, SOLUTION INTRAVENOUS at 01:48

## 2018-01-01 RX ADMIN — METRONIDAZOLE 500 MG: 500 INJECTION, SOLUTION INTRAVENOUS at 06:04

## 2018-01-01 RX ADMIN — IPRATROPIUM BROMIDE AND ALBUTEROL SULFATE 3 ML: .5; 3 SOLUTION RESPIRATORY (INHALATION) at 09:51

## 2018-01-01 RX ADMIN — PROPOFOL 10 MCG/KG/MIN: 10 INJECTION, EMULSION INTRAVENOUS at 23:31

## 2018-01-01 RX ADMIN — PHENYLEPHRINE HYDROCHLORIDE 200 MCG: 10 INJECTION INTRAVENOUS at 22:56

## 2018-01-01 RX ADMIN — ACETAMINOPHEN 650 MG: 325 TABLET, FILM COATED ORAL at 03:34

## 2018-01-01 RX ADMIN — SODIUM CHLORIDE 2721 ML: 9 INJECTION, SOLUTION INTRAVENOUS at 20:13

## 2018-01-01 RX ADMIN — PROPOFOL 25 MCG/KG/MIN: 10 INJECTION, EMULSION INTRAVENOUS at 07:59

## 2018-01-01 RX ADMIN — PROPOFOL 55 MCG/KG/MIN: 10 INJECTION, EMULSION INTRAVENOUS at 17:47

## 2018-01-01 RX ADMIN — VANCOMYCIN HYDROCHLORIDE 1900 MG: 100 INJECTION, POWDER, LYOPHILIZED, FOR SOLUTION INTRAVENOUS at 04:22

## 2018-01-01 RX ADMIN — MIDAZOLAM 5 MG: 1 INJECTION INTRAMUSCULAR; INTRAVENOUS at 14:45

## 2018-01-01 RX ADMIN — PROPOFOL 55 MCG/KG/MIN: 10 INJECTION, EMULSION INTRAVENOUS at 05:54

## 2018-01-01 RX ADMIN — PROPOFOL 55 MCG/KG/MIN: 10 INJECTION, EMULSION INTRAVENOUS at 13:09

## 2018-01-01 RX ADMIN — CEFEPIME 2 G: 2 INJECTION, POWDER, FOR SOLUTION INTRAMUSCULAR; INTRAVENOUS at 21:07

## 2018-01-01 RX ADMIN — NALOXONE HYDROCHLORIDE 0.4 MG: 0.4 INJECTION, SOLUTION INTRAMUSCULAR; INTRAVENOUS; SUBCUTANEOUS at 04:54

## 2018-01-01 RX ADMIN — CEFTRIAXONE 2 G: 2 INJECTION, POWDER, FOR SOLUTION INTRAMUSCULAR; INTRAVENOUS at 08:23

## 2018-01-01 RX ADMIN — PROPOFOL 45 MCG/KG/MIN: 10 INJECTION, EMULSION INTRAVENOUS at 03:16

## 2018-01-01 RX ADMIN — CHLORHEXIDINE GLUCONATE 15 ML: 1.2 RINSE ORAL at 08:22

## 2018-01-01 RX ADMIN — MORPHINE SULFATE 50 MG: 50 INJECTION, SOLUTION, CONCENTRATE INTRAVENOUS at 17:30

## 2018-01-01 RX ADMIN — PROPOFOL 55 MCG/KG/MIN: 10 INJECTION, EMULSION INTRAVENOUS at 09:58

## 2018-01-01 RX ADMIN — SODIUM CHLORIDE 1000 ML: 9 INJECTION, SOLUTION INTRAVENOUS at 23:01

## 2018-01-01 RX ADMIN — CHLORHEXIDINE GLUCONATE 15 ML: 1.2 RINSE ORAL at 21:05

## 2018-01-01 RX ADMIN — MORPHINE SULFATE 8 MG/HR: 50 INJECTION, SOLUTION, CONCENTRATE INTRAVENOUS at 13:31

## 2018-01-01 RX ADMIN — METRONIDAZOLE 500 MG: 500 INJECTION, SOLUTION INTRAVENOUS at 22:39

## 2018-01-01 RX ADMIN — CALCIUM GLUCONATE 1000 MG: 94 INJECTION, SOLUTION INTRAVENOUS at 21:57

## 2018-01-01 RX ADMIN — LORAZEPAM 2 MG: 2 INJECTION INTRAMUSCULAR; INTRAVENOUS at 14:02

## 2018-01-01 RX ADMIN — STANDARDIZED SENNA CONCENTRATE AND DOCUSATE SODIUM 2 TABLET: 8.6; 5 TABLET, FILM COATED ORAL at 07:37

## 2018-01-01 RX ADMIN — DEXMEDETOMIDINE HYDROCHLORIDE 1.5 MCG/KG/HR: 100 INJECTION, SOLUTION INTRAVENOUS at 14:10

## 2018-01-01 RX ADMIN — FENTANYL CITRATE 50 MCG: 50 INJECTION, SOLUTION INTRAMUSCULAR; INTRAVENOUS at 17:42

## 2018-01-01 RX ADMIN — MAGNESIUM HYDROXIDE 30 ML: 400 SUSPENSION ORAL at 07:37

## 2018-01-01 RX ADMIN — PROPOFOL 35 MCG/KG/MIN: 10 INJECTION, EMULSION INTRAVENOUS at 13:47

## 2018-01-01 RX ADMIN — FUROSEMIDE 20 MG: 20 TABLET ORAL at 00:23

## 2018-01-01 RX ADMIN — IPRATROPIUM BROMIDE AND ALBUTEROL SULFATE 3 ML: .5; 3 SOLUTION RESPIRATORY (INHALATION) at 15:31

## 2018-01-01 RX ADMIN — CEFEPIME 2 G: 2 INJECTION, POWDER, FOR SOLUTION INTRAMUSCULAR; INTRAVENOUS at 07:37

## 2018-01-01 RX ADMIN — IPRATROPIUM BROMIDE AND ALBUTEROL SULFATE 3 ML: .5; 3 SOLUTION RESPIRATORY (INHALATION) at 06:41

## 2018-01-01 RX ADMIN — HEPARIN SODIUM 5000 UNITS: 5000 INJECTION, SOLUTION INTRAVENOUS; SUBCUTANEOUS at 05:47

## 2018-01-01 RX ADMIN — VANCOMYCIN HYDROCHLORIDE 2300 MG: 100 INJECTION, POWDER, LYOPHILIZED, FOR SOLUTION INTRAVENOUS at 00:35

## 2018-01-01 RX ADMIN — HEPARIN SODIUM 5000 UNITS: 5000 INJECTION, SOLUTION INTRAVENOUS; SUBCUTANEOUS at 05:29

## 2018-01-01 RX ADMIN — PROPOFOL 55 MCG/KG/MIN: 10 INJECTION, EMULSION INTRAVENOUS at 21:08

## 2018-01-01 ASSESSMENT — LIFESTYLE VARIABLES: REASON UNABLE TO ASSESS: INTUBATED

## 2018-02-15 PROBLEM — R41.82 ALTERED MENTAL STATUS: Status: ACTIVE | Noted: 2018-01-01

## 2018-02-16 PROBLEM — Z87.19 HISTORY OF GASTROESOPHAGEAL REFLUX (GERD): Status: ACTIVE | Noted: 2018-01-01

## 2018-02-16 PROBLEM — E87.5 HYPERKALEMIA: Status: ACTIVE | Noted: 2018-01-01

## 2018-02-16 PROBLEM — E11.9 TYPE 2 DIABETES MELLITUS (HCC): Status: ACTIVE | Noted: 2018-01-01

## 2018-02-16 PROBLEM — N17.9 ACUTE KIDNEY FAILURE (HCC): Status: ACTIVE | Noted: 2018-01-01

## 2018-02-16 PROBLEM — E87.29 INCREASED ANION GAP METABOLIC ACIDOSIS: Status: ACTIVE | Noted: 2018-01-01

## 2018-02-16 PROBLEM — J96.01 ACUTE RESPIRATORY FAILURE WITH HYPOXIA (HCC): Status: ACTIVE | Noted: 2018-01-01

## 2018-02-16 PROBLEM — C34.90 LUNG CANCER METASTATIC TO BONE (HCC): Status: ACTIVE | Noted: 2018-01-01

## 2018-02-16 PROBLEM — C79.51 LUNG CANCER METASTATIC TO BONE (HCC): Status: ACTIVE | Noted: 2018-01-01

## 2018-02-16 PROBLEM — I10 ESSENTIAL HYPERTENSION: Status: ACTIVE | Noted: 2018-01-01

## 2018-02-16 PROBLEM — D64.9 ANEMIA: Status: ACTIVE | Noted: 2018-01-01

## 2018-02-16 PROBLEM — G47.33 OSA (OBSTRUCTIVE SLEEP APNEA): Status: ACTIVE | Noted: 2018-01-01

## 2018-02-16 PROBLEM — A41.9 SEPSIS (HCC): Status: ACTIVE | Noted: 2018-01-01

## 2018-02-16 NOTE — PROCEDURES
Procedure Note    Date: 2/15/2018  Time: 11.30pm    Procedure: Central Venous Line placement  Site: Right Internal Jugular     Indication: Hypotension.  Consent: Informed consent obtained from patient or designated decision maker after explaining the benefits/risks of the procedure including but not limited to bleeding, infection, nerve or other deep structure injury, pneumothorax/hemothorax, arrythmia, or death. Patient or surrogate expressed understanding and agreement and signed consent which can be found in the patient's chart.    Procedure: After obtaining consent, a time-out was performed. Appropriate site confirmed with ultrasound and patient positioned, prepped, and draped in sterile fashion. All those present wearing cap and mask and those physically participating remained adhering to sterile fashion with cap, mask, gloves, and gown. Patient was intubated and on propofol   achieving appropriate comfort level for patient,and so no local anaesthetic was used. Vein localized and accessed using continuous ultrasound guidance and a 7 Fr triple lumen catheter placed using Seldinger technique. Able to aspirate dark, non-pulsatile blood through each lumen and sterile saline flushed easily before capping. Line secured and dressed in sterile fashion. Patient tolerated procedure well without any difficulties and remains in care of bedside nurse. CXR will be performed to confirm appropriate placement for internal jugular or subclavian CVLs.    EBL: minimal < 5mls  Complications:None   CXR: Right IJ cathether in appropriate position

## 2018-02-16 NOTE — ASSESSMENT & PLAN NOTE
Presented with SOB and altered mental status  SIRS 2/4 , WBC 14.6 and    He became hypotensive which did not respond to fluid bolus and so he was started on pressors, phenylephrine.  UA non concerning for UTI, CXR concerning for probably post obstructive Pneumonia ?   Lactate 1.7 >1.4>2.2  Procalcitonin 0.55  Blood culture x 2 . Follow up.  S/p fluids.    Plan:  -Continue with Vancomycin plus Cefepime and Flagyl

## 2018-02-16 NOTE — PROGRESS NOTES
Left message for daughter to contact our team regarding knowledge of any meds patient is taking or what pharmacy he fills at.

## 2018-02-16 NOTE — CARE PLAN
Problem: Ventilation Defect:  Goal: Ability to achieve and maintain unassisted ventilation or tolerate decreased levels of ventilator support    Intervention: Manage ventilation therapy by monitoring diagnostic test results  Adult Ventilation Update    Total Vent Days: 2    Patient Lines/Drains/Airways Status    Active Airway     Name: Placement date: Placement time: Site: Days:    Airway Group ET Tube Oral 7.5 02/15/18   2053   Oral   less than 1            Static Compliance (ml / cm H2O): 61 (02/16/18 0103)    Patient failed trials because of    Barriers to SBT    Length of Weaning Trial       Sputum/Suction   Cough: Congested;Productive (02/16/18 0103)  Sputum Amount: Moderate (02/16/18 0103)  Sputum Color: Tan (02/16/18 0103)  Sputum Consistency: Thick (02/16/18 0103)    Mobility Group  Activity Performed: Unable to mobilize (02/16/18 0100)  Pt Calls for Assistance: No (02/16/18 0100)  Reason Not Mobilized: Bed rest (02/16/18 0100)    Events/Summary/Plan: Hola sent to lab (02/16/18 0103)

## 2018-02-16 NOTE — ASSESSMENT & PLAN NOTE
Resolved  Presented with serum K of 5.8, no EKG changes,  Was given IV calcuim gluconate.  Could be 2/2 lisniopril which patient takes for HTN  will continue to monitor.

## 2018-02-16 NOTE — ASSESSMENT & PLAN NOTE
-On Glipizide 10mg BID,Insuline glargine 60 units BID, Exenatide.   -HBA1C 6.7    Plan  ISS, accu checks, hypoglycemia protocol.

## 2018-02-16 NOTE — DIETARY
"Nutrition Support Assessment      Nutrition services:   Day 1 of admit.  61 yo male with admitting diagnosis: AMS     Current problem list:  1. lung cancer with mets to bone  2. Sepsis  3. Respiratory distress - on vent  4. AMS on admit - +opioids, cannabinoids, oxycodone     Assessment:  Estimated Nutritional Needs: based on: height 6'1\", weight 96.5 kg, IBW 83.45 kg, BMI 28.07     Calculation/Equation MSJ x 1.2 = 2200 kcals  Calories/day: 2100 - 2300  (22 - 24 kcals/kg)  Protein/day: 116 - 135 g (1.2 - 1.4 g/kg)      Evaluation:   1. AMS and on vent - pt in need of nutrition support. Consult for TF received.  2. cortrak to be placed for enteral access  3. Possible pressure ulcer to sacrum noted POA - not seen by wound team yet  4. History of type 2 DM - pt will benefit from low CHO TF formula - Diabetisource.      Recommendations/Plan:  1. When feeding tube is placed and confirmed start and advance TF per protocol  2. Diabetisource full goal 75 ml/hr will provide 2160 kcals, 108 g protein, 1462 ml H20/day            "

## 2018-02-16 NOTE — ASSESSMENT & PLAN NOTE
-Presented with Creatinine of 3.49 and BUN 65.  -FENA <1%., UA negative for infectious process.  -CT abdomen/pelvis shows Edematous appearing LEFT kidney of uncertain significance.  Mass is not excluded.  -Improved with IV fluids from 3.50>2.69>3.17>2.30  -Continue to monitor.   -Avoid NSAIDs, nephrotoxins.

## 2018-02-16 NOTE — H&P
Internal Medicine Admitting History and Physical    Note Author: Juliette Morales M.D.       Name Charter, Jake Araujo     1955   Age/Sex 62 y.o. male   MRN [id number]   Code Status Partial     After 5PM or if no immediate response to page, please call for cross-coverage  Attending/Team: Carrie/ TEE See Patient List for primary contact information  Call [phone] to page    1st Call - Day Intern (R1):   Go 2nd Call - Day Sr. Resident (R2/R3):   Dr Mcdaniel       Chief Complaint:  Altered Mental status.    History is from medical records and daughter who was at the bedside,    HPI:  61 yo male with a PMH of HTN, DM, GERD,JUANITO,stage IV lung cancer diagnosed 2018 sees Dr Galeana, CKD presented with AMS which was noticed by family Lasst night.Per daughter who gave the history, patient had a visitor who they looked like a hippie. He was seen to have given patient some pills. Patient started behaving weird, incoherent speech, which worsened and by 1.30pm the next day he was non verbal, non responsive lying on the couch. EMS was called and patient was brought to the [facility] from [place] where.he lives.  In the ER, he was found to be tachypnea with RR of 24, ,/65mmHg, saturating in low 90's on 4L of oxygen.ABG showed PH of 7.26, CO2 38.6, PO2 1118, Hco3 17.7  He was then intubated due to worsening respiratory status. Urine drug screen was positive for Opoids, oxycodone and cannabinols, Serum K was 5.6 with no concerning EKG changes. WBC 14.6, AG 15.  CT chest done showd large upper lobe mass, extending from hilum to chest wall  anterior , measuring 9cm , left hilar and mediastinal adenopathy, compresion of the left lower lobe bronchus  He became hypotensive and was given 1LBolus NS, IL NS and 2.7ML of NS. Patient did not adequately respond to NS boluses and so a Central line in the Right IJ inserted for phenylephrine.       Review of Systems   Unable to perform ROS: Patient unresponsive            "  Past Medical History:   Past Medical History:   Diagnosis Date   • Arrhythmia     in past    • Diabetes     IDDM   • Heart burn     acid reflux   • Hypertension    • Indigestion    • Sleep apnea    • Snoring        Past Surgical History:  Past Surgical History:   Procedure Laterality Date   • BRENT BY LAPAROSCOPY     • OTHER ORTHOPEDIC SURGERY      bilcarpal yusef   • OTHER ORTHOPEDIC SURGERY      right wrist recontruction       Current Outpatient Medications:  Home Medications    **Home medications have not yet been reviewed for this encounter**         Medication Allergy/Sensitivities:  Allergies   Allergen Reactions   • Pcn [Penicillins]    • Tape      And EKG pads         Family History:  History reviewed. No pertinent family history.    Social History:  Social History     Social History   • Marital status:      Spouse name: N/A   • Number of children: N/A   • Years of education: N/A     Occupational History   • Not on file.     Social History Main Topics   • Smoking status: Former Smoker     Packs/day: 1.00     Years: 30.00     Quit date: 1/1/1989   • Smokeless tobacco: Former User     Types: Chew     Quit date: 1/1/1975   • Alcohol use No   • Drug use: No   • Sexual activity: Not on file     Other Topics Concern   • Not on file     Social History Narrative   • No narrative on file     Living situation: Lives at home  PCP : Liudmila Choudhury FNP      Physical Exam     Vitals:    02/16/18 0400 02/16/18 0430 02/16/18 0500 02/16/18 0600   BP:       Pulse: (!) 56  66 (!) 57   Resp: (!) 24  (!) 25 (!) 24   Temp:       SpO2: 99% 99% 99% 99%   Weight:       Height:         Body mass index is 28.07 kg/m².  /65   Pulse (!) 57   Temp 36.7 °C (98.1 °F)   Resp (!) 24   Ht 1.854 m (6' 1\")   Wt 96.5 kg (212 lb 11.9 oz)   SpO2 99%   BMI 28.07 kg/m²   O2 therapy: Pulse Oximetry: 99 %, O2 (LPM): 3, FIO2%: 30, O2 Delivery: Ventilator    Physical Exam   Constitutional:   Sedated and intubated   HENT: "   Head: Normocephalic and atraumatic.   Right Ear: External ear normal.   Left Ear: External ear normal.   Neck: Neck supple. No JVD present. No thyromegaly present.   Cardiovascular: Normal heart sounds and intact distal pulses.  Exam reveals no gallop and no friction rub.    No murmur heard.  Pulmonary/Chest: No respiratory distress. He has no wheezes.   Decreased breath sounds on the left.   Abdominal: Soft. Bowel sounds are normal. He exhibits no distension. There is no tenderness.   Musculoskeletal: He exhibits edema. He exhibits no deformity.   Neurological:   GCS 4/15.  Intubated and sedated   DTR: 2 + globally.         Data Review       Old Records Request:   Completed  Current Records review and summary: Completed    Lab Data Review:  Recent Results (from the past 24 hour(s))   Lactic acid (lactate)    Collection Time: 02/15/18  8:00 PM   Result Value Ref Range    Lactic Acid 1.7 0.5 - 2.0 mmol/L   CBC WITH DIFFERENTIAL    Collection Time: 02/15/18  8:00 PM   Result Value Ref Range    WBC 14.6 (H) 4.8 - 10.8 K/uL    RBC 4.60 (L) 4.70 - 6.10 M/uL    Hemoglobin 11.1 (L) 14.0 - 18.0 g/dL    Hematocrit 35.8 (L) 42.0 - 52.0 %    MCV 77.8 (L) 81.4 - 97.8 fL    MCH 24.1 (L) 27.0 - 33.0 pg    MCHC 31.0 (L) 33.7 - 35.3 g/dL    RDW 46.4 35.9 - 50.0 fL    Platelet Count 305 164 - 446 K/uL    MPV 10.9 9.0 - 12.9 fL    Neutrophils-Polys 86.60 (H) 44.00 - 72.00 %    Lymphocytes 6.20 (L) 22.00 - 41.00 %    Monocytes 5.90 0.00 - 13.40 %    Eosinophils 0.60 0.00 - 6.90 %    Basophils 0.20 0.00 - 1.80 %    Immature Granulocytes 0.50 0.00 - 0.90 %    Nucleated RBC 0.00 /100 WBC    Neutrophils (Absolute) 12.65 (H) 1.82 - 7.42 K/uL    Lymphs (Absolute) 0.90 (L) 1.00 - 4.80 K/uL    Monos (Absolute) 0.86 (H) 0.00 - 0.85 K/uL    Eos (Absolute) 0.09 0.00 - 0.51 K/uL    Baso (Absolute) 0.03 0.00 - 0.12 K/uL    Immature Granulocytes (abs) 0.08 0.00 - 0.11 K/uL    NRBC (Absolute) 0.00 K/uL   COMP METABOLIC PANEL    Collection Time:  02/15/18  8:00 PM   Result Value Ref Range    Sodium 136 135 - 145 mmol/L    Potassium 5.8 (H) 3.6 - 5.5 mmol/L    Chloride 103 96 - 112 mmol/L    Co2 18 (L) 20 - 33 mmol/L    Anion Gap 15.0 (H) 0.0 - 11.9    Glucose 124 (H) 65 - 99 mg/dL    Bun 65 (H) 8 - 22 mg/dL    Creatinine 3.49 (H) 0.50 - 1.40 mg/dL    Calcium 9.7 8.5 - 10.5 mg/dL    AST(SGOT) 70 (H) 12 - 45 U/L    ALT(SGPT) 32 2 - 50 U/L    Alkaline Phosphatase 198 (H) 30 - 99 U/L    Total Bilirubin 0.4 0.1 - 1.5 mg/dL    Albumin 3.3 3.2 - 4.9 g/dL    Total Protein 8.4 (H) 6.0 - 8.2 g/dL    Globulin 5.1 (H) 1.9 - 3.5 g/dL    A-G Ratio 0.6 g/dL   ESTIMATED GFR    Collection Time: 02/15/18  8:00 PM   Result Value Ref Range    GFR If  22 (A) >60 mL/min/1.73 m 2    GFR If Non  18 (A) >60 mL/min/1.73 m 2   ARTERIAL BLOOD GAS w/ O2 (LAB)    Collection Time: 02/15/18  8:13 PM   Result Value Ref Range    Ph 7.34 (L) 7.40 - 7.50    Pco2 36.1 26.0 - 37.0 mmHg    Po2 80.5 64.0 - 87.0 mmHg    O2 Saturation 93.3 93.0 - 99.0 %    Hco3 19 17 - 25 mmol/L    Base Excess -6 (L) -4 - 3 mmol/L    Body Temp see below Centigrade   URINALYSIS    Collection Time: 02/15/18  8:23 PM   Result Value Ref Range    Color Yellow     Character Clear     Specific Gravity 1.018 <1.035    Ph 5.0 5.0 - 8.0    Glucose Negative Negative mg/dL    Ketones Negative Negative mg/dL    Protein Negative Negative mg/dL    Bilirubin Negative Negative    Urobilinogen, Urine 1.0 Negative    Nitrite Negative Negative    Leukocyte Esterase Negative Negative    Occult Blood Small (A) Negative    Micro Urine Req Microscopic    URINE MICROSCOPIC (W/UA)    Collection Time: 02/15/18  8:23 PM   Result Value Ref Range    WBC 0-2 (A) /hpf    RBC 2-5 (A) /hpf    Bacteria Negative None /hpf    Epithelial Cells Negative /hpf    Hyaline Cast 0-2 /lpf   EKG (ER)    Collection Time: 02/15/18  8:32 PM   Result Value Ref Range    Report       [facility] Emergency  Dept.    Test Date:  2018-02-15  Pt Name:    RUDDY ESPINOZA              Department: ER  MRN:        [id number]                      Room:        20  Gender:     Male                         Technician: [id number]  :        1955                   Requested By:MAKAYLA MANZANO  Order #:    [id number]                    Reading MD:    Measurements  Intervals                                Axis  Rate:       102                          P:          44  MN:         184                          QRS:        -2  QRSD:       80                           T:          108  QT:         348  QTc:        454    Interpretive Statements  SINUS TACHYCARDIA  PROBABLE INFERIOR INFARCT, OLD  LATERAL LEADS ARE ALSO INVOLVED  Compared to ECG 2013 14:55:31  Myocardial infarct finding now present  Sinus rhythm no longer present  T-wave abnormality no longer present  Possible ischemia no longer present     ISTAT ARTERIAL BLOOD GAS    Collection Time: 02/15/18  9:59 PM   Result Value Ref Range    Ph 7.269 (LL) 7.400 - 7.500    Pco2 38.6 (H) 26.0 - 37.0 mmHg    Po2 118 (H) 64 - 87 mmHg    Tco2 19 (L) 20 - 33 mmol/L    S02 98 93 - 99 %    Hco3 17.7 17.0 - 25.0 mmol/L    BE -9 (L) -4 - 3 mmol/L    Body Temp see below degrees    O2 Therapy 50 %    Specimen Arterial     Action Range Triggered YES     Inst. Qualified Patient YES    Lactic acid (lactate)    Collection Time: 02/15/18 10:33 PM   Result Value Ref Range    Lactic Acid 1.4 0.5 - 2.0 mmol/L   TRIGLYCERIDE    Collection Time: 02/15/18 10:33 PM   Result Value Ref Range    Triglycerides 168 (H) 0 - 149 mg/dL   ACCU-CHEK GLUCOSE    Collection Time: 18  1:11 AM   Result Value Ref Range    Glucose - Accu-Ck 140 (H) 65 - 99 mg/dL   Basic Metabolic Panel (BMP)    Collection Time: 18  4:35 AM   Result Value Ref Range    Sodium 139 135 - 145 mmol/L    Potassium 4.9 3.6 - 5.5 mmol/L    Chloride 111 96 - 112 mmol/L    Co2 18 (L) 20 - 33 mmol/L    Glucose 127 (H) 65 - 99 mg/dL    Bun  65 (H) 8 - 22 mg/dL    Creatinine 3.50 (H) 0.50 - 1.40 mg/dL    Calcium 8.1 (L) 8.5 - 10.5 mg/dL    Anion Gap 10.0 0.0 - 11.9   Magnesium    Collection Time: 02/16/18  4:35 AM   Result Value Ref Range    Magnesium 1.9 1.5 - 2.5 mg/dL   Phosphorus    Collection Time: 02/16/18  4:35 AM   Result Value Ref Range    Phosphorus 5.2 (H) 2.5 - 4.5 mg/dL   SALICYLATE    Collection Time: 02/16/18  4:35 AM   Result Value Ref Range    Salicylates, Quant. 0 (L) 15 - 25 mg/dL   ESTIMATED GFR    Collection Time: 02/16/18  4:35 AM   Result Value Ref Range    GFR If  21 (A) >60 mL/min/1.73 m 2    GFR If Non  18 (A) >60 mL/min/1.73 m 2   ISTAT ARTERIAL BLOOD GAS    Collection Time: 02/16/18  4:37 AM   Result Value Ref Range    Ph 7.384 (L) 7.400 - 7.500    Pco2 28.6 26.0 - 37.0 mmHg    Po2 130 (H) 64 - 87 mmHg    Tco2 18 (L) 20 - 33 mmol/L    S02 99 93 - 99 %    Hco3 17.1 17.0 - 25.0 mmol/L    BE -7 (L) -4 - 3 mmol/L    Body Temp 98.2 F degrees    O2 Therapy 40 %    Ph Temp Eunice 7.387 (L) 7.400 - 7.500    Pco2 Temp Co 28.3 26.0 - 37.0 mmHg    Po2 Temp Cor 128 (H) 64 - 87 mmHg    Specimen Arterial     Action Range Triggered NO     Inst. Qualified Patient YES    CBC WITH DIFFERENTIAL    Collection Time: 02/16/18  5:55 AM   Result Value Ref Range    WBC 10.3 4.8 - 10.8 K/uL    RBC 3.24 (L) 4.70 - 6.10 M/uL    Hemoglobin 7.6 (L) 14.0 - 18.0 g/dL    Hematocrit 26.0 (L) 42.0 - 52.0 %    MCV 80.2 (L) 81.4 - 97.8 fL    MCH 23.5 (L) 27.0 - 33.0 pg    MCHC 29.2 (L) 33.7 - 35.3 g/dL    RDW 48.1 35.9 - 50.0 fL    Platelet Count 208 164 - 446 K/uL    MPV 11.0 9.0 - 12.9 fL    Neutrophils-Polys 78.10 (H) 44.00 - 72.00 %    Lymphocytes 11.10 (L) 22.00 - 41.00 %    Monocytes 8.80 0.00 - 13.40 %    Eosinophils 1.30 0.00 - 6.90 %    Basophils 0.40 0.00 - 1.80 %    Immature Granulocytes 0.30 0.00 - 0.90 %    Nucleated RBC 0.00 /100 WBC    Neutrophils (Absolute) 8.03 (H) 1.82 - 7.42 K/uL    Lymphs (Absolute) 1.14 1.00 -  4.80 K/uL    Monos (Absolute) 0.90 (H) 0.00 - 0.85 K/uL    Eos (Absolute) 0.13 0.00 - 0.51 K/uL    Baso (Absolute) 0.04 0.00 - 0.12 K/uL    Immature Granulocytes (abs) 0.03 0.00 - 0.11 K/uL    NRBC (Absolute) 0.00 K/uL   ACCU-CHEK GLUCOSE    Collection Time: 02/16/18  6:04 AM   Result Value Ref Range    Glucose - Accu-Ck 117 (H) 65 - 99 mg/dL       Imaging/Procedures Review:    ndependant Imaging Review: Completed  DX-CHEST-PORTABLE (1 VIEW)   Final Result      No significant change from prior exam.      DX-CHEST-PORTABLE (1 VIEW)   Final Result      1.  Supportive tubing as described above.   2.  No pneumothorax.   3.  No other significant change from prior exam.         [id number] VIEW   Final Result      Orogastric tube tip at the mid stomach.      CT-CHEST,ABDOMEN,PELVIS W/O   Final Result      1.  Large LEFT upper lobe lung mass extending from hilum to chest wall anteriorly, measuring approximately 9 cm with probable associated consolidated lung.   2.  LEFT hilar and mediastinal adenopathy, metastatic.   3.  Apparent compression of LEFT lower lobe bronchus.   4.  Nodules and ill-defined opacity in the RIGHT lung, likely metastatic.   5.  Edematous appearing LEFT kidney of uncertain significance.  Mass is not excluded.   6.  Limited by lack of IV contrast.      DX-CHEST-PORTABLE (1 VIEW)   Final Result      1.  Interval placement of endotracheal tube and increased inflation.   2.  Persistent hazy opacity LEFT lung with apparent volume loss potentially indicating endobronchial obstruction.   3.  No pneumothorax.      [id number] VIEW   Final Result      Orogastric tube is not visualized.      DX-CHEST-PORTABLE (1 VIEW)   Final Result      1.  Hypoinflation with hazy opacity LEFT hemithorax which may indicate pneumonia and/or pleural effusion.  Superimposed mass is not excluded.   2.  No pneumothorax.   3.  Cardiac contours not well evaluated.           EKG:   EKG Independant Review: Completed  QTc:454, HR:  102, sinus Tachycardia.     Records reviewed and summarized in current documentation             Assessment/Plan     * Lung cancer metastatic to bone (CMS-HCC)   Assessment & Plan    Had sudden onset of hoarseness on 12/25/17.  He was worked up, bronchoscopy was done on 01/25/2018.  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  PET Scan done 01/18/2018, : Wide spread soft tissue disease including ROSHNI lung mass, mediastinal adenopathy with liver an d osseous mets noted.  Follows with Dr Galeana in [place]    Plan  Oncologist consult in the AM        Sepsis (CMS-HCC)   Assessment & Plan    Presented with SOB and altered mental status  SIRS 2/4 , WBC 14.6 and    He became hypotensive which did not respond to fluid bolus and so she was started on pressors, phenylephrine.  Lactate 1.7 >1.4    Plan   On ceftriaxone and metronidazole.  Blood culture x 2 . Follow up.  Procalcitonin.          Acute respiratory failure with hypoxia (CMS-HCC)   Assessment & Plan    Presented with AMS, on 4L of oxygen, ABG showed PH of 7.26, CO2 38.6, PO2 118, Hco3 17.7.  He was then intubated following worsening respiratory function function              Altered mental status   Assessment & Plan    He was reported to have received some pills from an unknown person following which he became altered, with worsening of his mental state and shortness of breath.  He was brought to the ED by EMS.  DDx : infective, metabolic, hypoglycemia.  Urine drug screen positive for Opiods, cannabinoids and oxycodone..  He was then intubated due to worsening respiratory function    Plan.  Narcan 0.4mg.          Type 2 diabetes mellitus (CMS-HCC)   Assessment & Plan    On Glipizide 10mg BID,Insuline glargine 60 units BID,     Plan  Hgb A1C  ISS , hold insulin glargine.  Hypoglycemic protocol.        Hyperkalemia   Assessment & Plan    Presented with serum K of 5.8, no EKG changes,  Was given IV calcuim gluconate.  Could be 2/2  lisniopril which patient takes for HTN    Plan   Repeat serum BMP.  will continue to monitor.        History of gastroesophageal reflux (GERD)   Assessment & Plan    Patient is on Esomerazole 40mg BID        JUANITO (obstructive sleep apnea)   Assessment & Plan    On CPAP at home        Essential hypertension   Assessment & Plan    Hx of Hypertension,  Presented with a BP of 134/65mHg. However BP dropped.  On Lisinopril 20mg and diltiazem  120mg      Plan   Hold antihypertensives due to hypotension in the setting of sepsis            Anticipated Hospital stay:  >2 midnights    Quality Measures  Quality-Core Measures   Reviewed items::  EKG reviewed, Labs reviewed and Medications reviewed  DVT prophylaxis pharmacological::  Heparin

## 2018-02-16 NOTE — RESPIRATORY CARE
Intubation Assist    Intubation assist performed Yes  Reason for intubation Airway protection  Positive Color Change on EZCap? Yes    Difficult Intubation/Number of attempts 1  Evidence of aspiration none  Airway Group ET Tube Oral 7.5-Secured At  (cm): 24 (02/15/18 2053)  Jimenez Vent Mode: (S)CMV (02/15/18 2052)     Rate (breaths/min): 18 (02/15/18 2052)  Vt Target (mL): 500 (02/15/18 2052)  FiO2: 60 (02/15/18 2052)  PEEP/CPAP: 8 (02/15/18 2052)

## 2018-02-16 NOTE — ED PROVIDER NOTES
"ED Provider Note    Scribed for Homer Calle M.D. by Nils Rossi. 2/15/2018, 8:07 PM.    Primary care provider: Michi Cannon M.D.  Means of arrival: EMS  History obtained from: EMS, Patient's Daughter  History limited by: Patient's Critical Condition    CHIEF COMPLAINT  Chief Complaint   Patient presents with   • ALOC     per EMS, pt family reports pt has been ALOC and SOB. pt has not been \"acting right\" for last day. pt recently dx with lung cancer. EMS reports pt had a \"friend\" give pt some meds to help with pain. meds are unknown at this time. pt GCS of 11. FSBG 99.    • Shortness of Breath       HPI  Jake Castillo is a 62 y.o. male who presents to the Emergency Department for evaluation of altered mental status with associated severe shortness of breath onset today. Per patient's daughter, the patient was recently diagnosed with metastatic lung cancer and has plans to undergo radiation therapy. At 1:30 PM, patient's daughter reports her nephew called her from [place] stating that the patient was incoherent and unable to communicate. Nephew called EMS, and daughter states the patient's symptoms seemed to resolve upon EMS arrival. A few hours later, patient's daughter went to check on the patient and noticed he was altered and having severe shortness of breath. Patient's daughter notes that last night the patient ingested three unknown pills that were given to him by an unknown visitor. On exam, patient is requiring 4 L of supplemental oxygen, but he is extremely short of breath and nonverbal at this time.     History is limited secondary to patients critical condition.    REVIEW OF SYSTEMS  Pertinent positives include altered level of consciousness, shortness of breath .   See HPI for further details. C.      ROS is limited secondary to patients critical condition.    PAST MEDICAL HISTORY   has a past medical history of Arrhythmia; Diabetes; Heart burn; Hypertension; Indigestion; Sleep apnea; and " "Snoring.    SURGICAL HISTORY   has a past surgical history that includes angela by laparoscopy; other orthopedic surgery; and other orthopedic surgery.    SOCIAL HISTORY  Social History   Substance Use Topics   • Smoking status: Former Smoker     Packs/day: 1.00     Years: 30.00     Quit date: 1/1/1989   • Smokeless tobacco: Former User     Types: Chew     Quit date: 1/1/1975   • Alcohol use No      History   Drug Use No       FAMILY HISTORY  History reviewed. No pertinent family history.    CURRENT MEDICATIONS  No current facility-administered medications on file prior to encounter.      No current outpatient prescriptions on file prior to encounter.      ALLERGIES  Allergies   Allergen Reactions   • Pcn [Penicillins]    • Tape      And EKG pads       PHYSICAL EXAM  VITAL SIGNS: /65   Pulse (!) 129   Temp 36.7 °C (98.1 °F)   Resp (!) 24   Ht 1.854 m (6' 1\")   Wt 90.7 kg (200 lb)   SpO2 93%   BMI 26.39 kg/m²   Vitals reviewed.  Constitutional: Lethargic and drowsy in moderate respiratory distress.   HENT: No signs of trauma, Bilateral external ears normal, Nose normal. Dry mucous membranes.   Eyes: Pupils are equal and reactive, Conjunctiva normal, Non-icteric.   Neck: No stridor.   Lymphatic: No lymphadenopathy noted.   Cardiovascular: Tachycardic with regular rhythm, no murmurs.   Thorax & Lungs: Decreased breath sounds bilaterally, sonorous respirations, No wheezing   Abdomen: Bowel sounds normal, Soft, grimaces with abdominal palpation, No peritoneal signs, No masses, No pulsatile masses.   Skin: Warm, Dry, No erythema, No rash.   Extremities: Intact distal pulses, No edema, No cyanosis  Musculoskeletal: No obvious deformities.    Neurologic: Drowsy, lethargic, non-verbal, follows some commands, absent gag reflex,   Psychiatric: Unable to assess     DIAGNOSTIC STUDIES / PROCEDURES      LABS  Labs Reviewed   CBC WITH DIFFERENTIAL - Abnormal; Notable for the following:        Result Value    WBC 14.6 " "(*)     RBC 4.60 (*)     Hemoglobin 11.1 (*)     Hematocrit 35.8 (*)     MCV 77.8 (*)     MCH 24.1 (*)     MCHC 31.0 (*)     Neutrophils-Polys 86.60 (*)     Lymphocytes 6.20 (*)     Neutrophils (Absolute) 12.65 (*)     Lymphs (Absolute) 0.90 (*)     Monos (Absolute) 0.86 (*)     All other components within normal limits   COMP METABOLIC PANEL - Abnormal; Notable for the following:     Potassium 5.8 (*)     Co2 18 (*)     Anion Gap 15.0 (*)     Glucose 124 (*)     Bun 65 (*)     Creatinine 3.49 (*)     AST(SGOT) 70 (*)     Alkaline Phosphatase 198 (*)     Total Protein 8.4 (*)     Globulin 5.1 (*)     All other components within normal limits   URINALYSIS - Abnormal; Notable for the following:     Occult Blood Small (*)     All other components within normal limits    Narrative:     Indication for culture:->Emergency Room Patient   ARTERIAL BLOOD GAS - Abnormal; Notable for the following:     Ph 7.34 (*)     Base Excess -6 (*)     All other components within normal limits   ESTIMATED GFR - Abnormal; Notable for the following:     GFR If  22 (*)     GFR If Non  18 (*)     All other components within normal limits   URINE MICROSCOPIC (W/UA) - Abnormal; Notable for the following:     WBC 0-2 (*)     RBC 2-5 (*)     All other components within normal limits    Narrative:     Indication for culture:->Emergency Room Patient   TRIGLYCERIDE - Abnormal; Notable for the following:     Triglycerides 168 (*)     All other components within normal limits   ISTAT ARTERIAL BLOOD GAS - Abnormal; Notable for the following:     Ph 7.269 (*)     Pco2 38.6 (*)     Po2 118 (*)     Tco2 19 (*)     BE -9 (*)     All other components within normal limits   LACTIC ACID   LACTIC ACID   URINE CULTURE(NEW)    Narrative:     Indication for culture:->Emergency Room Patient   BLOOD CULTURE    Narrative:     Per Hospital Policy: Only change Specimen Src: to \"Line\" if  specified by physician order.   BLOOD CULTURE    " "Narrative:     Per Hospital Policy: Only change Specimen Src: to \"Line\" if  specified by physician order.   URINE DRUG SCREEN   URINE SODIUM RANDOM   URINE CREATININE RANDOM   CULTURE RESPIRATORY W/ GRM STN   HEMOGLOBIN A1C   MAGNESIUM   CBC WITH DIFFERENTIAL   BASIC METABOLIC PANEL   MAGNESIUM   PHOSPHORUS   URINE DRUG SCREEN   SALICYLATE      All labs reviewed by me.    EKG Interpretation:  Interpreted by me    12 Lead EKG interpreted by me to show:  Normal sinus rhythm  Rate 102  Axis: Normal  Intervals: Normal  TWI in leads 1 and avL  Normal ST segments  My impression of this EKG: Potential ischemia. No STEMI.    RADIOLOGY  DX-CHEST-PORTABLE (1 VIEW)   Final Result      1.  Supportive tubing as described above.   2.  No pneumothorax.   3.  No other significant change from prior exam.         [id number] VIEW   Final Result      Orogastric tube tip at the mid stomach.      CT-CHEST,ABDOMEN,PELVIS W/O   Final Result      1.  Large LEFT upper lobe lung mass extending from hilum to chest wall anteriorly, measuring approximately 9 cm with probable associated consolidated lung.   2.  LEFT hilar and mediastinal adenopathy, metastatic.   3.  Apparent compression of LEFT lower lobe bronchus.   4.  Nodules and ill-defined opacity in the RIGHT lung, likely metastatic.   5.  Edematous appearing LEFT kidney of uncertain significance.  Mass is not excluded.   6.  Limited by lack of IV contrast.      DX-CHEST-PORTABLE (1 VIEW)   Final Result      1.  Interval placement of endotracheal tube and increased inflation.   2.  Persistent hazy opacity LEFT lung with apparent volume loss potentially indicating endobronchial obstruction.   3.  No pneumothorax.      [id number] VIEW   Final Result      Orogastric tube is not visualized.      DX-CHEST-PORTABLE (1 VIEW)   Final Result      1.  Hypoinflation with hazy opacity LEFT hemithorax which may indicate pneumonia and/or pleural effusion.  Superimposed mass is not excluded.   2.  No " pneumothorax.   3.  Cardiac contours not well evaluated.      DX-CHEST-PORTABLE (1 VIEW)    (Results Pending)     The radiologist's interpretation of all radiological studies have been reviewed by me.    Intubation Procedure Note    Indication: hypoxia and airway protection    Consent: The family members were counseled regarding the procedure, its indications, risks, potential complications and alternatives, and any questions were answered. Consent was obtained to proceed.    Medications Used: propofol intravenously    Procedure: The patient was placed in the appropriate position.  Cricoid pressure was not utilized.  Intubation was performed by glidescope and a 7.5 cuffed endotracheal tube.  The cuff was then inflated and the tube was secured appropriately at a distance of 23 cm to the lips. Initially, breath sounds were heard on the right greater than the left and the tube was repositioned with good bilateral breath sounds.  A chest x-ray to verify correct placement of the tube has been ordered but is still pending.    The patient tolerated the procedure well.     Complications: None        COURSE & MEDICAL DECISION MAKING  Nursing notes, VS, PMSFHx reviewed in chart.  Differential diagnoses include but not limited to: sepsis, infection, dehydration, medication side effect, PE, ACS    8:07 PM Patient seen and examined at bedside. Patient arrives tachycardic and tachypneic but otherwise afebrile. Patient is lethargic, ill-appearing, dehydrated, and non-verbal. He has sonorous respirations and is requiring supplemental oxygen. Patient does not have a gag reflex Ordered for chest x-ray, abdominal x-ray, chest abdomen pelvis CT, estimated GFR, blood culture x2, CMP, CBC with differential, lactic acid, ABG, UA, EKG to evaluate. Patient will be treated with 2721 mL NS infusion, 2300 mg Vancomycin for his symptoms. He does have a history of PCN allergy but it is not listed as severe and the benefits of cefepime outweigh  the risks. Will initiate cefepime as well. I discussed the plan of care as above with the patient's daughter, including possible plans for intubation. Daughter was counseled on the procedure and the risks associated. She understood and verbalized agreement.        8:43 PM - Performed intubation procedure. See above note for further details. Patient started on propofol drip following intubation. [place] catheter and OG tube placed.    Labs reveal leukocytosis, mild anemia, hyperkalemia, anion gap, significant MAIKEL (creatinine of 3.49), and elevated AST. Lactic acid is normal.    Patient given calcium gluconate for elevated potassium.    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    CT without contrast performed of chest/abdomen/pelvis which revealed large lung mass c/w prior diagnosis.    10:16 PM Paged [facility]/ICU.     10:28 PM - I discussed the patient's case and the above findings with [facility] who agrees to admit patient.     10:35 PM - Acutely called to patient's room. Patient is hypotensive. Propofol drip was discontinued at this time. The patient will be treated with IV fluids. Nursing staff discussed with ICU attending who came to place a central line with residents.    CRITICAL CARE  The very real possibilty of a deterioration of this patient's condition required the highest level of my preparedness for sudden, emergent intervention.  I provided critical care services, which included medication orders, frequent reevaluations of the patient's condition and response to treatment, ordering and reviewing test results, and discussing the case with various consultants.  The critical care time associated with the care of the patient was 35 minutes. Review chart for interventions. This time is exclusive of any other billable procedures.        DISPOSITION:  Patient will be admitted to [facility] in critical condition.      FINAL IMPRESSION  1. Sepsis, due to unspecified organism (CMS-HCC)    2. Acute respiratory failure with hypoxia (CMS-HCC)    3. Malignant neoplasm of left lung, unspecified part of lung (CMS-HCC)    4. Hyperkalemia    5. Altered mental status, unspecified altered mental status type    6. Acute kidney injury (CMS-HCC)         Critical Care Time: 35 minutes     Nils PARKER (Scribe), am scribing for, and in the presence of, Homer Calle M.D..    Electronically signed by: Nils Rossi (Scribe), 2/15/2018    IHomer M.D. personally performed the services described in this documentation, as scribed by Nils Rossi in my presence, and it is both accurate and complete.    The note accurately reflects work and decisions made by me.  Homer Calle  2/16/2018  7:23 AM

## 2018-02-16 NOTE — CARE PLAN
Problem: Communication  Goal: The ability to communicate needs accurately and effectively will improve  Outcome: PROGRESSING AS EXPECTED  Follows X4    Problem: Urinary Elimination:  Goal: Ability to reestablish a normal urinary elimination pattern will improve  Outcome: PROGRESSING AS EXPECTED  [place] in place

## 2018-02-16 NOTE — PROGRESS NOTES
"Pulmonary Critical Care Progress Note        Chief Complaint: Acute hypoxic respiratory failure and altered level of   consciousness.    History of Present Illness: \"All information taken from chart review, sign out and oldest daughter at bedside, as the patient currently on full mechanical ventilatory support.  This appears to be a 62-year-old gentleman with known past medical history of diabetes, hypertension, GERD and JUANITO.  The patient was recently diagnosed with what is believed to be nonsmall cell lung cancer stage IV with metastatic disease to bones, specifically left pelvis and liver [facility], has a large left upper lobe mass with right-sided pulmonary nodules as well.  The patient apparently has been experiencing severe left hip pain as well as left knee pain, [facility] and prescribed oxycodone, which per daughter only intensified the pain without any alleviation.  He otherwise was in relatively decent health, was planning on having radiation to the left pelvis as well as treatment of the hepatic lesion followed by chemotherapy for his underlying cancer in the coming weeks.  It appears the patient was in his baseline state up until yesterday evening, at which point in time he was seen by his younger daughter's friend, who is a 25-year-old hippy and frequents the marijuana stores, and after patient isolating himself with this gentleman, he apparently received some form of medication to help treat the pain, but it is uncertain what this was.  After which he was noted to be poorly arousable from approximately 8:00 p.m. yesterday through today.  His oldest daughter went to check on him at which point in time noticing that he was not arousable, called EMS and had the patient transferred to [facility] for further evaluation.  Otherwise, there is no mention of recent acute illness or other complaints besides that mentioned.\"    Review of Systems   Unable to perform ROS: Critical illness "       Interval Events:  24 hour interval history reviewed   - admitted ON   - Neuro: Agitated, follows   - HR: 50s-80s SR   - BP: labile - sedation related   - GI: NPO, cortrak to be placed   - UOP: adequate   - Menchaca: yes   - Tm: 99.3   - Lines: CVC   - PPx: GI pepcid, DVT SCDs   - ABG: Metabolic acidosis   - CXR (compared to prior): Large left mass, tubes in good position    PFSH:  No change.    Physical Exam   Constitutional: He is intubated.   HENT:   Head: Normocephalic and atraumatic.   Right Ear: External ear normal.   Left Ear: External ear normal.   Nose: Nose normal.   Mouth/Throat: Oropharynx is clear and moist.   Eyes: Conjunctivae and EOM are normal. Pupils are equal, round, and reactive to light. Right eye exhibits no discharge. Left eye exhibits no discharge.   Neck: Normal range of motion. Neck supple. No JVD present. No tracheal deviation present.   Cardiovascular: Normal rate, regular rhythm, normal heart sounds and intact distal pulses.    No murmur heard.  Pulmonary/Chest: He is intubated. He is in respiratory distress. He has decreased breath sounds (Left). He has rhonchi (L>R).   Abdominal: Soft. Bowel sounds are normal. He exhibits no distension. There is no tenderness.   Musculoskeletal: He exhibits no edema or tenderness.   Neurological: He is alert.   follows   Skin: Skin is warm. No rash noted.   Nursing note and vitals reviewed.      Respiratory:  Jimenez Vent Mode: APVCMV, Rate (breaths/min): 25, Vt Target (mL): 480, PEEP/CPAP: 8, FiO2: 30, Static Compliance (ml / cm H2O): 58, Control VTE (exp VT): 525  Pulse Oximetry: 98 %  Chest Tube Drains:  ImagingAvailable data reviewed   Recent Labs      02/15/18   2013  02/15/18   2159  02/16/18   0437   CBLQK80G  7.34*   --    --    [id number]  36.1   --    --    [id number]  80.5   --    --    XMOG0GXO  93.3   --    --    ARTHCO3  19   --    --    ARTBE  -6*   --    --    ISTATAPH   --   7.269*  7.384*   ISTATAPCO2   --   38.6*  28.6   ISTATAPO2    --   118*  130*   ISTATATCO2   --   19*  18*   [id number]   --   98  99   ISTATARTHCO3   --   17.7  17.1   ISTATARTBE   --   -9*  -7*   ISTATTEMP   --   see below  98.2 F   ISTATFIO2   --   50  40   ISTATSPEC   --   Arterial  Arterial   ISTATAPHTC   --    --   7.387*   [id number]   --    --   128*       HemoDynamics:  Pulse: 74, Heart Rate (Monitored): 73  Blood Pressure: 134/65, NIBP: 119/61  CVP (mm Hg): (!) 15 MM HG  Imaging: Available data reviewed        Neuro:  GCS Total Rosa Coma Score: 11  Imaging: Available data reviewed    Fluids:  Intake/Output       02/14/18 0700 - 02/15/18 0659 (Not Admitted) 02/15/18 0700 - 02/16/18 0659 02/16/18 0700 - 02/17/18 0659      0700-1859 5303-8462 Total 0700-1859 1900-0659 Total 7389-8712 3801-0727 Total       Intake    I.V.  --  -- --  --  3402.6 3402.6  --  -- --    Precedex Volume -- -- -- -- 11.2 11.2 -- -- --    Phenylephrine Volume -- -- -- -- 7.7 7.7 -- -- --    Propofol Volume -- -- -- -- 33.6 33.6 -- -- --    IV Piggyback Volume (vanco) -- -- -- -- 500 500 -- -- --    IV Piggyback Volume (flagyl) -- -- -- -- 100 100 -- -- --    IV Volume (bolus) -- -- -- -- 2000 2000 -- -- --    IV Volume (NS) -- -- -- -- 750 750 -- -- --    Enteral  --  -- --  --  60 60  --  -- --    Free Water / Tube Flush -- -- -- -- 60 60 -- -- --    Total Intake -- -- -- -- 3462.6 3462.6 -- -- --       Output    Urine  --  -- --  --  600 600  --  -- --    Indwelling Cathether -- -- -- -- 600 600 -- -- --    Drains  --  -- --  --  300 300  --  -- --    Oral Gastric Tube -- -- -- -- 300 300 -- -- --    Stool  --  -- --  --  -- --  --  -- --    Number of Times Stooled -- -- -- -- 0 x 0 x -- -- --    Total Output -- -- -- -- 900 900 -- -- --       Net I/O     -- -- -- -- 2562.6 2562.6 -- -- --        Weight: 96.5 kg (212 lb 11.9 oz)  Recent Labs      02/15/18   2000  02/16/18   0435   SODIUM  136  139   POTASSIUM  5.8*  4.9   CHLORIDE  103  111   CO2  18*  18*   BUN  65*  65*   CREATININE   3.49*  3.50*   MAGNESIUM   --   1.9   PHOSPHORUS   --   5.2*   CALCIUM  9.7  8.1*       GI/Nutrition:  Imaging: Available data reviewed  NPO  Liver Function  Recent Labs      02/15/18   2000  02/16/18   0435   ALTSGPT  32   --    ASTSGOT  70*   --    ALKPHOSPHAT  198*   --    TBILIRUBIN  0.4   --    GLUCOSE  124*  127*       Heme:  Recent Labs      02/15/18   2000  02/16/18   0555  18   0827   RBC  4.60*  3.24*  3.38*   HEMOGLOBIN  11.1*  7.6*  7.9*   HEMATOCRIT  35.8*  26.0*  27.4*   PLATELETCT  305  208  204       Infectious Disease:  Monitored Temp  Av °C (98.6 °F)  Min: 36.8 °C (98.2 °F)  Max: 37.4 °C (99.32 °F)  Temp  Av.7 °C (98.1 °F)  Min: 36.7 °C (98.1 °F)  Max: 36.7 °C (98.1 °F)  Micro: antibiotics reviewed and cultures reviewed  Recent Labs      02/15/18   2000  02/16/18   0555  02/16/18   08   WBC  14.6*  10.3  9.8   NEUTSPOLYS  86.60*  78.10*  79.00*   LYMPHOCYTES  6.20*  11.10*  9.80*   MONOCYTES  5.90  8.80  8.50   EOSINOPHILS  0.60  1.30  1.80   BASOPHILS  0.20  0.40  0.30   ASTSGOT  70*   --    --    ALTSGPT  32   --    --    ALKPHOSPHAT  198*   --    --    TBILIRUBIN  0.4   --    --      Current Facility-Administered Medications   Medication Dose Frequency Provider Last Rate Last Dose   • dexmedetomidine (PRECEDEX) 400 mcg in D5W 100 mL infusion  0.1-1.5 mcg/kg/hr Continuous Josh Butler M.D. 6.8 mL/hr at 18 0730 0.3 mcg/kg/hr at 18 0730   • Respiratory Care per Protocol   Continuous RT Juliette Morales M.D.       • heparin injection 5,000 Units  5,000 Units Q8HRS Juliette Morales M.D.   5,000 Units at 18 0529   • labetalol (NORMODYNE,TRANDATE) injection 10 mg  10 mg Q4HRS PRN Juliette Morales M.D.       • phenylephrine (CINTHYA-SYNEPHRINE) 40,000 mcg in  mL Infusion  0-300 mcg/min Continuous Josh Butler M.D.   Stopped at 18 0502   • PROPOFOL 10 MG/ML IV EMUL       Stopped at 18 0263   • propofol (DIPRIVAN) injection  0-80 mcg/kg/min  Continuous Kay Lara PharmD   Stopped at 02/15/18 2302   • Respiratory Care per Protocol   Continuous RT Josh Butler M.D.       • senna-docusate (PERICOLACE or SENOKOT S) 8.6-50 MG per tablet 2 Tab  2 Tab BID Josh Butler M.D.   Stopped at 02/16/18 0108    And   • polyethylene glycol/lytes (MIRALAX) PACKET 1 Packet  1 Packet QDAY PRN Josh Butler M.D.        And   • magnesium hydroxide (MILK OF MAGNESIA) suspension 30 mL  30 mL QDAY PRN Josh Butler M.D.        And   • bisacodyl (DULCOLAX) suppository 10 mg  10 mg QDAY PRN Josh Butler M.D.       • chlorhexidine (PERIDEX) 0.12 % solution 15 mL  15 mL BID Josh Butler M.D.   15 mL at 02/16/18 0822   • lidocaine (XYLOCAINE) 1%  injection  1-2 mL Q30 MIN PRN Josh Butler M.D.       • NS infusion   Continuous Josh Butler M.D. 150 mL/hr at 02/16/18 0118     • fentaNYL (SUBLIMAZE) injection 25 mcg  25 mcg Q HOUR PRN Josh Butler M.D.        Or   • fentaNYL (SUBLIMAZE) injection 50 mcg  50 mcg Q HOUR PRN Josh Butler M.D.        Or   • fentaNYL (SUBLIMAZE) injection 100 mcg  100 mcg Q HOUR PRN Josh Butler M.D.       • ipratropium-albuterol (DUONEB) nebulizer solution 3 mL  3 mL Q2HRS PRN (RT) Josh Butler M.D.       • ipratropium-albuterol (DUONEB) nebulizer solution 3 mL  3 mL Q4HRS (RT) Josh Butler M.D.   3 mL at 02/16/18 0721   • famotidine (PEPCID) tablet 20 mg  20 mg DAILY Josh Butler M.D.        Or   • famotidine (PEPCID) injection 20 mg  20 mg DAILY Josh Butler M.D.   20 mg at 02/16/18 0822   • insulin regular (HUMULIN R) injection 2-9 Units  2-9 Units Q6HRS Josh Butler M.D.   Stopped at 02/16/18 0112    And   • glucose 4 g chewable tablet 16 g  16 g Q15 MIN PRN Josh Butler M.D.        And   • dextrose 50% (D50W) injection 25 mL  25 mL Q15 MIN PRN Josh Butler M.D.       • cefTRIAXone (ROCEPHIN) syringe 2 g  2 g Q24HRS Josh Butlre M.D.   2 g at 02/16/18 0823   • metroNIDAZOLE  (FLAGYL) IVPB 500 mg  500 mg Q8HRS Josh Butler M.D.   Stopped at 02/16/18 0629     Last reviewed on 6/26/2013  2:47 PM by Svetlana Andino R.N.    Quality  Measures:  Labs reviewed, Medications reviewed and Radiology images reviewed  [place] catheter: Critically Ill - Requiring Accurate Measurement of Urinary Output  Central line in place: Concentrated IV drugs and Need for access      DVT prophylaxis - mechanical: SCDs  Ulcer prophylaxis: Yes  Antibiotics: Treating active infection/contamination beyond 24 hours perioperative coverage      Problems/Plan:  Altered level of consciousness after receiving unknown medication.   - Appears to be resolving   - CT, EEG    Acute hypoxic respiratory failure   - RT/O2 protocols   - Daily and PRN ABGs   - Titration of ventilator therapy based on ABGs and patient's status   - Sedation as tolerated/indicated   - Daily CXR   - HOB >30 degrees and peridex for VAP prevention   - Pepcid for GI prophylaxis   - SAT/SBT when able (ABCDEF Bundle)   - Early mobility    Leukocytosis   - Vanco, Maxipime, Flagyl for possible pneumonia    Acute kidney injury   - renal dose meds, avoid nephrotoxins    Hyperkalemia   - resolved    Anion gap metabolic acidosis   - improved    Metastatic lung cancer   - with metastasis to the left pelvis and liver   - [facility]    Diabetes   - SSI, accuchecks    Chronic hypertension   - hold meds for now    Obstructive sleep apnea, compliant with CPAP.    Gastroesophageal reflux disease   - pepcid    Incomplete medical database    Discussed patient condition and risk of morbidity and/or mortality with Family, RN, RT, Pharmacy, Dietary, , [employer] [facility] resident, Charge nurse / hot rounds and Patient.    The patient remains critically ill.  Critical care time = 95 minutes in directly providing and coordinating critical care and extensive data review.  No time overlap and excludes procedures.    ADDENDUM:  This afternoon the patient's  respiratory status worsened, and he is now requiring 100% FiO2 with significant amount of PEEP. His chest x-ray demonstrates right hilar opacities consistent with ARDS. Bronchoscopy and aggressive care was offered to the family and refused. Extensive family meetings were held with multiple family members discussing goals of care, they state that the patient never wanted to be intubated. Comfort care was initiated twice then called off by family. We will continue with aggressive care for now until family is able to make a final decision,

## 2018-02-16 NOTE — FLOWSHEET NOTE
Respiratory Therapy Update                       Cough: Congested;Productive;Weak (02/16/18 1512)  Sputum Amount: Moderate (02/16/18 1512)  Sputum Color: Tan;Yellow (02/16/18 1512)  Sputum Consistency: Thick (02/16/18 1512)                  O2 (LPM): 3 (02/15/18 1958)       Breath Sounds  Pre/Post Intervention: Pre Intervention Assessment (02/16/18 0715)  RUL Breath Sounds: Coarse Crackles (02/16/18 1512)  RML Breath Sounds: Crackles (02/16/18 1512)  RLL Breath Sounds: Crackles;Diminished (02/16/18 1512)  ROSHNI Breath Sounds: Coarse Crackles (02/16/18 1512)  LLL Breath Sounds: Crackles;Diminished (02/16/18 1512)      Events/Summary/Plan: Inline held due to high HR and condition.  RN agrees.  Pt. going comfort care. (02/16/18 1512)

## 2018-02-16 NOTE — CONSULTS
DATE OF SERVICE:  02/15/2018    REQUESTING PHYSICIAN:  Homer Calle MD    REASON FOR REQUEST:  Acute hypoxic respiratory failure and altered level of   consciousness.    HISTORY OF PRESENT ILLNESS:  All information taken from chart review, sign out   and oldest daughter at bedside, as the patient currently on full mechanical   ventilatory support.  This appears to be a 62-year-old gentleman with known   past medical history of diabetes, hypertension, GERD and JUANITO.  The patient was   recently diagnosed with what is believed to be nonsmall cell lung cancer   stage IV with metastatic disease to bones, specifically left pelvis and liver   [facility], has a large left upper lobe mass with right-sided   pulmonary nodules as well.  The patient apparently has been experiencing   severe left hip pain as well as left knee pain, [facility] and   prescribed oxycodone, which per daughter only intensified the pain without any   alleviation.  He otherwise was in relatively decent health, was planning on   having radiation to the left pelvis as well as treatment of the hepatic lesion   followed by chemotherapy for his underlying cancer in the coming weeks.  It   appears the patient was in his baseline state up until yesterday evening, at   which point in time he was seen by his younger daughter's friend, who is a   25-year-old hippy and frequents the marijuana stores, and after patient   isolating himself with this gentleman, he apparently received some form of   medication to help treat the pain, but it is uncertain what this was.  After   which he was noted to be poorly arousable from approximately 8:00 p.m.   yesterday through today.  His oldest daughter went to check on him _____ at   which point in time noticing that he was not arousable, called EMS and had the   patient transferred to [facility] for further evaluation.  Otherwise, there is no   mention of recent acute illness or other complaints  besides that mentioned.    ALLERGIES:  PENICILLIN.    OUTPATIENT MEDICATIONS:  Currently unknown.  [facility]    FAMILY HISTORY:  Unable to obtain given clinical state.    PAST MEDICAL HISTORY:  As indicated above in HPI.    SOCIAL HISTORY:  Unable to currently obtain given clinical state.    REVIEW OF SYSTEMS:  Unable to obtain.    PHYSICAL EXAMINATION:  VITAL SIGNS:  Temperature 36.7, pulse rate 104, blood pressure 109/78, satting   99% on 50% FiO2 on full vent support.  GENERAL:  The patient is well nourished, sedate.  HEENT:  Normocephalic, atraumatic, anicteric.  Pupils equal, round, reactive,   approximately 3 mm.  CARDIOVASCULAR:  Tachycardic rate, regular rhythm.  RESPIRATORY:  Clear on the right, diminished to absent breath sounds in the   left anterior, diminished in the left basilar.  ABDOMEN:  Soft, nontender and nondistended.  Positive bowel sounds.  EXTREMITIES:  Trace bilateral lower extremity edema.  NEUROLOGIC:  Currently on propofol and sedate.  PSYCHIATRIC:  Unable to assess given clinical state.    LABORATORY DATA:  White count 14.6, H and H of 11 and 35, platelets 305,   positive left shift.  Sodium 136, potassium 5.8, chloride 103, bicarbonate 18,   anion gap 15, glucose 124, BUN 65, creatinine 3.49, AST 70, ALT 32, alkaline   phosphatase 198.  Lactic acid 1.7.  ABG with pH 7.34, pCO2 of 36, pO2 of 80,   saturation of 93% uncertain of the settings this was on.  Urinalysis with   small occult blood, otherwise unremarkable.    IMAGING:  CT chest, abdomen and pelvis without contrast showing a large 9-cm   mass in the left upper lobe.  Left hilar and mediastinal adenopathy.  Right   lung pulmonary nodules.  Edematous appearing left kidney.    ASSESSMENT:  1.  Altered level of consciousness after receiving unknown medication.  2.  Acute hypoxic respiratory failure.  3.  Leukocytosis.  4.  Acute kidney injury.  5.  Hyperkalemia.  6.  Anion gap metabolic acidosis.  9.  Metastatic lung  cancer, presumed with metastasis to the left pelvis and   liver, unknown type.  10.  Chronic diabetes.  11.  Chronic hypertension.  12.  Obstructive sleep apnea, compliant with CPAP.  13.  Gastroesophageal reflux disease.  14.  [facility].  15.  Incomplete medical database.    PLAN:  Again, this is a 62-year-old gentleman with multiple comorbidities,   recently diagnosed with metastatic lung cancer and severe underlying bone pain   in the left pelvis as well as knee who took unknown ingestion yesterday after   which he has been found to be unresponsive, and upon arrival into the   emergency department was intubated for airway control.  At this present time,   we will continue supportive therapy with full mechanical ventilatory support.    I am adjusting ventilator according to ABG as well as pulmonary mechanics.    Patient will be on respiratory therapy and oxygen therapy protocols.  We will   initiate Unasyn therapy for the possibility of underlying aspiration.  We will   check blood cultures as well as sputum culture to evaluate for possible   infectious etiologies.  As per the patient's acute kidney injury, he has Men[place]   in place.  CT did show left edematous kidney, but no obvious obstructive   hydronephrosis.  We will check urine electrolytes as well as creatinine and   place the patient on IV fluids.  We will check a magnesium and repeat   potassium to identify improvement from the 5.8.  No current indication for   emergent treatment as there is no obvious cardiac dysrhythmia associated.  We   will need to [facility] from the [facility] to identify any further underlying   medical conditions as well as home medications.  If no improvement in   neurologic status, would get an EEG as well as consider brain imaging.    Further, we will maintain glucose control with serial fingerstick blood sugars   as well as sliding scale insulin.    Prognosis is guarded.    Total critical care time excluding billable procedures  35 minutes.       ____________________________________     MD TRICIA Gleason / QUINCY    DD:  02/15/2018 22:24:17  DT:  02/16/2018 03:17:32    D#:  [id number]  Job#:  [id number]

## 2018-02-16 NOTE — PROGRESS NOTES
The Medication Reconciliation process has been completed by interviewing the patient's daughter who had me call the [facility] for [facility].  I am unable to interview the patient but reported off to the ICU pharmacist.    Allergies have been reviewed  Antibiotic use in 30 days - Clarithromycin, Cefuroxime 1/8/18 7 day courses    [facility]  [facility]

## 2018-02-16 NOTE — ED TRIAGE NOTES
"BIB EMS    Chief Complaint   Patient presents with   • ALOC     per EMS, pt family reports pt has been ALOC and SOB. pt has not been \"acting right\" for last day. pt recently dx with lung cancer. EMS reports pt had a \"friend\" give pt some meds to help with pain. meds are unknown at this time. pt GCS of 11. FSBG 99.    • Shortness of Breath     Pt septic score 5 , charge informed.     Pt in gown, chart up for ERP.   "

## 2018-02-16 NOTE — ASSESSMENT & PLAN NOTE
Had sudden onset of hoarseness on 12/25/17.  He was worked up, bronchoscopy was done on 01/25/2018.  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  PET Scan done 01/18/2018, : Wide spread soft tissue disease including ROSHNI lung mass, mediastinal adenopathy with liver and osseous mets noted.  [facility]  [facility] head negative for metastatic disease.  CT chest/abdomen/pelvis: 1.  Large LEFT upper lobe lung mass extending from hilum to chest wall anteriorly, measuring approximately 9 cm with probable associated consolidated lung. LEFT hilar and mediastinal adenopathy, metastatic. Apparent compression of LEFT lower lobe bronchus.  Nodules and ill-defined opacity in the RIGHT lung, likely metastatic.  Edematous appearing LEFT kidney of uncertain significance.  Mass is not excluded.  Limited by lack of IV contrast.  -Metastatic lung cancer, presumed with metastasis to the left pelvis and liver

## 2018-02-16 NOTE — ASSESSMENT & PLAN NOTE
Hx of Hypertension,  Presented with a BP of 134/65mHg. However BP dropped.  On Lisinopril 20mg and diltiazem 120mg      Plan   Hold antihypertensives due to hypotension in the setting of sepsis

## 2018-02-16 NOTE — ED NOTES
Late note: pt intubated.    2045: start of intubation    2046: 100 mcg of propofol  Given    2047: 100 mg succinylcholine given     2048: tube placed. Equal rise and fall of chest, positive of EZ cap. Breath sounds heard. Secured at 24 cm.

## 2018-02-16 NOTE — ASSESSMENT & PLAN NOTE
-Presented with AMS, on 4L of oxygen, ABG showed PH of 7.26, CO2 38.6, PO2 118, Hco3 17.7.  -He was then intubated following worsening respiratory function function   - CXR shows prominent left parahilar opacity probably mass-with post obstructive pneumonia? and right basilar opacity(ARDS versus Pneumonia versus Pulmonary edema).   - ARDS in the setting of sepsis:IV lasix 20 mg BID.   - ?Post obstructive Pneumonia with WBC 15K>13k>11.5   - Bronchoscopy done yesterday- gram stain- no organisms. Cultures pending  -RT protocol, duo-nebs, 02 supplementary therapy.     - continue vancomycin, cefepime and flagyl

## 2018-02-16 NOTE — ASSESSMENT & PLAN NOTE
He was reported to have received some pills from an unknown person following which he became altered, with worsening of his mental state and shortness of breath.  He was brought to the ED by EMS.  DDx : infective, metabolic, hypoglycemia.  Urine drug screen positive for Opiods, cannabinoids and oxycodone..  He was then intubated due to worsening respiratory function  S/p one dose of Narcan  CT head - no evidence of acute hemorrhage or metastatic disease.

## 2018-02-16 NOTE — CARE PLAN
Problem: Safety  Goal: Will remain free from injury  Outcome: PROGRESSING AS EXPECTED  No injuries this admission    Problem: Bowel/Gastric:  Goal: Normal bowel function is maintained or improved  Outcome: PROGRESSING AS EXPECTED  Monitoring for BM    Problem: Skin Integrity  Goal: Risk for impaired skin integrity will decrease  Outcome: PROGRESSING AS EXPECTED  Skin checks performed every shift

## 2018-02-17 NOTE — PROGRESS NOTES
"Pharmacy Kinetics 62 y.o. male on vancomycin day # 3  2/17/2018    Currently on Vancomycin 1900 mg iv q24hr    Indication for Treatment: Pneumonia     Pertinent history per medical record: Admitted on 2/15/2018. This appears to be a 62-year-old gentleman with known past medical history of diabetes, hypertension, GERD and JUANITO.  The patient was recently diagnosed with what is believed to be nonsmall cell lung cancer stage IV with metastatic disease to bones, specifically left pelvis and liver [facility], has a large left upper lobe mass with right-sided pulmonary nodules as well.      Other antibiotics: cefepime, Flagyl     Allergies: Pcn [penicillins] and Tape      List concerns for renal function: MAIKEL, BUN/SCr ratio > 20:1, age     Pertinent cultures to date:   2/15/18 blood cultures x 2 = NGTD  2/15/18 tracheal aspirate: = GPC, GNR    Recent Labs      02/15/18   2000  02/16/18   0555  02/16/18   0827  02/16/18   1417  02/17/18   0425   WBC  14.6*  10.3  9.8  15.0*  13.0*   NEUTSPOLYS  86.60*  78.10*  79.00*  76.50*  83.10*   BANDSSTABS   --    --    --   1.70   --      Recent Labs      02/15/18   2000  02/16/18   0435  02/16/18   1535  02/17/18   0425   BUN  65*  65*  60*  64*   CREATININE  3.49*  3.50*  2.69*  3.17*   ALBUMIN  3.3   --    --    --      Recent Labs      02/17/18   0115   VANCORANDOM  13.8     Intake/Output Summary (Last 24 hours) at 02/17/18 1427  Last data filed at 02/17/18 1100   Gross per 24 hour   Intake          1552.69 ml   Output             3650 ml   Net         -2097.31 ml      Blood pressure 134/65, pulse (!) 116, temperature 36.7 °C (98.1 °F), resp. rate (!) 60, height 1.854 m (6' 1\"), weight 97.5 kg (214 lb 15.2 oz), SpO2 98 %. No data recorded.      A/P   1. Vancomycin dose change: Yes, hold vanco  2. Next vancomycin level: 0400 on 2/18  3. Goal trough: 16-20 mcg/ml  4. Comments: Pt's SCr bumped up again. UOP appears adequate. Will hold vanco dosing for now and check a " level 24 hrs from this morning's dose.  Await further micro results.    Mono Poole, PharmD

## 2018-02-17 NOTE — PROGRESS NOTES
Internal Medicine Interval Note  Note Author: Lorraine Mcdaniel M.D.     Name CharterJake     1955   Age/Sex 62 y.o. male   MRN [id number]   Code Status Partial Code/DNR     After 5PM or if no immediate response to page, please call for cross-coverage  Attending/Team: Dr. Stephenson  See Patient List for primary contact information  Call [phone] to page    1st Call - Day Intern (R1):   Dr Stiles 2nd Call - Day Sr. Resident (R2/R3):   Dr Mcdaniel         Reason for interval visit  (Principal Problem)   Lung cancer metastatic to bone (CMS-HCC)   Acute hypoxic respiratory failure and altered level of consciousness.  Interval Problem Daily Status Update  (24 hours)   -Acute hypoxemic respiratory distress -Repeat ABG shows pH 7368, PCO2 30.8, Bicarb 17  with CXR showing no interval changes.(prominent left parahilar opacity probably mass-with post obstructive pneumonia? and right basilar opacity(ARDS versus Pneumonia versus Pulmonary edema).   -ARDS in the setting of sepsis:IV lasix 20 mg BID.   -Altered Mental Status-?Opiod overdose- UDS was positive for opiods, cannabinoids, oxycodone. CT head negative for acute pathology and/or mets.  -Nutrition: Cortrak placed today for enteral access and tube feeds per protocol.   - MAIKEL-Prerenal based on FENA- Improved with IV fluids from 3.50>2.69> 3.17.  -Post Obstructive Pneumonia - ?Post obstructive Pneumonia with WBC 15K>13k. On on Vancomycin plus Cefepime and Flagyl. Will be doing bronchoscopy today.   -Anion Gap Metabolic Acidosis/lactic Acidosis- Elevated Anion gap 15>11>9 with bicar 18>15>17(MAIKEL versus Infection)  -Acute Anemia:-Drop of Hgb from 11.1>7.6>7.9>10.2>8.7- kidney dysfunction/metatstatic lung cancer with probably bone marrow suppression versus others. No visible signs of bleed.       Review of Systems   Unable to perform ROS: Intubated       Consultants/Specialty  ICU  Pulmonology    Disposition  Inpatient for altered mental status and acute  hypoxemic respiratory distress    Quality Measures  Quality-Core Measures   Reviewed items::  Labs reviewed, EKG reviewed, Medications reviewed and Radiology images reviewed  [place] catheter::  Critically Ill - Requiring Accurate Measurement of Urinary Output  DVT prophylaxis pharmacological::  Heparin          Physical Exam       Vitals:    02/17/18 0900 02/17/18 1000 02/17/18 1100 02/17/18 1107   BP:       Pulse: (!) 109 (!) 111 (!) 116    Resp: 17 (!) 22 (!) 60    Temp:       SpO2: 99% 99%  98%   Weight:       Height:         Body mass index is 28.36 kg/m². Weight: 97.5 kg (214 lb 15.2 oz)  Oxygen Therapy:  Pulse Oximetry: 98 %, FIO2%: 90, O2 Delivery: Ventilator    Physical Exam      Lab Data Review:         2/16/2018  4:46 PM    Recent Labs      02/16/18 0435 02/16/18   1535 02/17/18   0425   SODIUM  139  135  139   POTASSIUM  4.9  5.3  4.8   CHLORIDE  111  109  113*   CO2  18*  15*  17*   BUN  65*  60*  64*   CREATININE  3.50*  2.69*  3.17*   MAGNESIUM  1.9   --   1.9   PHOSPHORUS  5.2*   --   4.5   CALCIUM  8.1*  8.1*  8.1*       Recent Labs      02/15/18   2000  02/16/18   0435  02/16/18   1535  02/17/18   0425   ALTSGPT  32   --    --    --    ASTSGOT  70*   --    --    --    ALKPHOSPHAT  198*   --    --    --    TBILIRUBIN  0.4   --    --    --    GLUCOSE  124*  127*  224*  177*       Recent Labs      02/16/18 0827 02/16/18   1417 02/17/18   0425   RBC  3.38*  4.30*  3.67*   HEMOGLOBIN  7.9*  10.2*  8.7*   HEMATOCRIT  27.4*  35.2*  29.4*   PLATELETCT  204  282  232       Recent Labs      02/15/18   2000   02/16/18   0827  02/16/18   1417  02/17/18   0425   WBC  14.6*   < >  9.8  15.0*  13.0*   NEUTSPOLYS  86.60*   < >  79.00*  76.50*  83.10*   LYMPHOCYTES  6.20*   < >  9.80*  12.20*  8.70*   MONOCYTES  5.90   < >  8.50  5.20  6.60   EOSINOPHILS  0.60   < >  1.80  2.60  0.60   BASOPHILS  0.20   < >  0.30  1.80  0.50   ASTSGOT  70*   --    --    --    --    ALTSGPT  32   --    --    --    --     ALKPHOSPHAT  198*   --    --    --    --    TBILIRUBIN  0.4   --    --    --    --     < > = values in this interval not displayed.           Assessment/Plan     * Lung cancer metastatic to bone (CMS-HCC)   Assessment & Plan    Had sudden onset of hoarseness on 12/25/17.  He was worked up, bronchoscopy was done on 01/25/2018.  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  PET Scan done 01/18/2018, : Wide spread soft tissue disease including ROSHNI lung mass, mediastinal adenopathy with liver and osseous mets noted.  [facility]  [facility] head negative for metastatic disease.  CT chest/abdomen/pelvis: 1.  Large LEFT upper lobe lung mass extending from hilum to chest wall anteriorly, measuring approximately 9 cm with probable associated consolidated lung. LEFT hilar and mediastinal adenopathy, metastatic. Apparent compression of LEFT lower lobe bronchus.  Nodules and ill-defined opacity in the RIGHT lung, likely metastatic.  Edematous appearing LEFT kidney of uncertain significance.  Mass is not excluded.  Limited by lack of IV contrast.  -Metastatic lung cancer, presumed with metastasis to the left pelvis and liver          Acute respiratory failure with hypoxia (CMS-HCC)   Assessment & Plan    -Presented with AMS, on 4L of oxygen, ABG showed PH of 7.26, CO2 38.6, PO2 118, Hco3 17.7.  -He was then intubated following worsening respiratory function function   -Repeat ABG shows pH 7368, PCO2 30.8, Bicarb 17  with CXR showing no interval changes.(prominent left parahilar opacity probably mass-with post obstructive pneumonia? and right basilar opacity(ARDS versus Pneumonia versus Pulmonary edema).   -ARDS in the setting of sepsis:IV lasix 20 mg BID.   - ?Post obstructive Pneumonia with WBC 15K>13k. On on Vancomycin plus Cefepime and Flagyl. Will be doing bronchoscopy today.   -RT protocol, duo-nebs, 02 supplementary therapy.            Anemia   Assessment & Plan    -Acute  Anemia:-Drop of Hgb from 11.1>7.6>7.9>10.2>8.7- kidney dysfunction/metatstatic lung cancer with probably bone marrow suppression versus others. No visible signs of bleed.         Increased anion gap metabolic acidosis   Assessment & Plan    Anion Gap Metabolic Acidosis/lactic Acidosis  -Elevated Anion gap 15>11>9 with bicar 18>15>17  -Etiologies include -MAIKEL versus Infection/Pneumonia.             Acute kidney failure (CMS-HCC)   Assessment & Plan    -Presented with Creatinine of 3.49 and BUN 65.  -FENA <1%., UA negative for infectious process.  -CT abdomen/pelvis shows Edematous appearing LEFT kidney of uncertain significance.  Mass is not excluded.  -Improved with IV fluids from 3.50>2.69>3.17  -Continue to monitor.   -Avoid NSAIDs, nephrotoxins.           Sepsis (CMS-HCC)   Assessment & Plan    Presented with SOB and altered mental status  SIRS 2/4 , WBC 14.6 and    He became hypotensive which did not respond to fluid bolus and so he was started on pressors, phenylephrine.  UA non concerning for UTI, CXR concerning for probably post obstructive Pneumonia ?   Lactate 1.7 >1.4>2.2  Procalcitonin 0.55  Blood culture x 2 . Follow up.  S/p fluids.    Plan:  -Continue with Vancomycin plus Cefepime and Flagyl.             Altered mental status   Assessment & Plan    He was reported to have received some pills from an unknown person following which he became altered, with worsening of his mental state and shortness of breath.  He was brought to the ED by EMS.  DDx : infective, metabolic, hypoglycemia.  Urine drug screen positive for Opiods, cannabinoids and oxycodone..  He was then intubated due to worsening respiratory function  S/p one dose of Narcan  CT head for acute hemorrhage or metastatic disease.            Type 2 diabetes mellitus (CMS-HCC)   Assessment & Plan    -On Glipizide 10mg BID,Insuline glargine 60 units BID, Exenatide.   -HBA1C 6.7    Plan  ISS, accu checks, hypoglycemia protocol.          History  of gastroesophageal reflux (GERD)   Assessment & Plan    Patient is on Esomerazole 40mg BID        JUANITO (obstructive sleep apnea)   Assessment & Plan    On CPAP at home        Essential hypertension   Assessment & Plan    Hx of Hypertension,  Presented with a BP of 134/65mHg. However BP dropped.  On Lisinopril 20mg and diltiazem  120mg      Plan   Hold antihypertensives due to hypotension in the setting of sepsis        Hyperkalemia   Assessment & Plan    Presented with serum K of 5.8, no EKG changes,  Was given IV calcuim gluconate.  Could be 2/2 lisniopril which patient takes for HTN    Plan   Repeat serum BMP.  will continue to monitor.

## 2018-02-17 NOTE — CARE PLAN
Problem: Skin Integrity  Goal: Risk for impaired skin integrity will decrease  Pt turned q2h. Bony prominences padded.     Problem: Pain Management  Goal: Pain level will decrease to patient's comfort goal  Pain assessed q2h and PRN. Pt medicated per MAR.

## 2018-02-17 NOTE — PROGRESS NOTES
RN to receive call from MD. New orders to be received and implemented. PEEP to go from 8 to 12 per MD.

## 2018-02-17 NOTE — PROGRESS NOTES
RN to discuss pt code status and POC with daughter, Scarlett, who wishes to keep pt on the ventilator at this time. RN to follow up with MD.

## 2018-02-17 NOTE — FLOWSHEET NOTE
Respiratory Therapy Update                       Cough: Productive (02/17/18 1107)  Sputum Amount: Small (02/17/18 1107)  Sputum Color: Tan;White;Yellow (02/17/18 1107)  Sputum Consistency: Thin (02/17/18 1107)                  O2 (LPM): 3 (02/15/18 1958)       Breath Sounds  Pre/Post Intervention: Pre Intervention Assessment (02/17/18 1107)  RUL Breath Sounds: Diminished (02/17/18 1107)  RML Breath Sounds: Diminished (02/17/18 1107)  RLL Breath Sounds: Diminished (02/17/18 1107)  ROSHNI Breath Sounds: Diminished (02/17/18 1107)  LLL Breath Sounds: Diminished (02/17/18 1107)        Events/Summary/Plan: Bronchoscopy done at bedside with Dr. Rouse via ETT.  Pt. tolerated fair.  Sample sent to lab. (02/17/18 7243)

## 2018-02-17 NOTE — CARE PLAN
Problem: Ventilation Defect:  Goal: Ability to achieve and maintain unassisted ventilation or tolerate decreased levels of ventilator support    Intervention: Support and monitor invasive and noninvasive mechanical ventilation  Adult Ventilation Update    Total Vent Days: 3    Patient Lines/Drains/Airways Status    Active Airway     Name: Placement date: Placement time: Site: Days:    Airway Group ET Tube Oral 7.5 02/15/18   2053   Oral   3              Cough: Productive (02/17/18 0226)  Sputum Amount: Small (02/17/18 0400)  Sputum Color: Tan;Yellow;Pink Tinged (02/17/18 0400)  Sputum Consistency: Thick;Thin (02/17/18 0400)    Mobility Group  Activity Performed: Unable to mobilize (02/17/18 0200)  Pt Calls for Assistance: No (02/16/18 0715)  Reason Not Mobilized: Unstable condition (PEEP 12) (02/17/18 0200)

## 2018-02-17 NOTE — PROCEDURES
DATE OF SERVICE:  02/16/2018    This is an inpatient EEG that was done on 02/16/2018.    ROUTINE ELECTROENCEPHALOGRAM REPORT        NAME: Anna Jake Lee     REFERRING Dr:     INDICATION: Acute hypoxic respiratory failure and altered level of   consciousness.        TECHNIQUE: 30 channel routine electroencephalogram (EEG) was performed in accordance with the international 10-20 system. The study was reviewed in bipolar and referential montages. The recording examined the patient during coma     DESCRIPTION OF THE RECORD:        Background rhythm shows nearly consistent polymorphic delta 1-3 Hz slow in the whole record.  No spike-and-wave discharges or any lateralizing abnormalities are seen. The patient remained unresponsive during this EEG test     ACTIVATION PROCEDURES:          ICTAL AND/OR INTERICTAL FINDINGS:    No focal or generalized epileptiform activity noted. No regional slowing was seen during this routine study.  No clinical events or seizures were reported or recorded during the study.       EKG: sampling of the EKG recording demonstrated regular rhythm most of the time          INTERPRETATION:        ________________________________________________________________________     This scalp EEG is consistent with diffuse nonspecific cortical dysfunction - moderate to severe     This nonspecific abnormalities could be secondary to metabolic, toxic or polypharmacy      There are no epileptiform discharges and no electrographic seizures in this record     EEG 02/16/18 6:28 PM     ________________________________________________________________________                           ____________________________________     MD FRANCY CARRILLO / QUINCY    DD:  02/16/2018 23:30:13  DT:  02/16/2018 19:10:43    D#:  [id number]  Job#:  [id number]

## 2018-02-17 NOTE — ASSESSMENT & PLAN NOTE
-Acute Anemia:-Drop of Hgb from 11.1>7.6>7.9>10.2>8.7>8.5- kidney dysfunction/metatstatic lung cancer with probably bone marrow suppression versus others. No visible signs of bleed.

## 2018-02-17 NOTE — PROGRESS NOTES
Cortrak Placement    Tube Team verified patient name and medical record number prior to tube placement.  Cortrak tube (55 inches, 10 Citizen of [place]) placed at 80 cm in right nare.  Per Cortrak picture, tube appears to be in the stomach.  Nursing Instructions: Awaiting KUB to confirm placement before use for medications or feeding. Once placement confirmed, flush tube with 30 ml of water, and then remove and save stylet, in patient medication drawer.

## 2018-02-17 NOTE — PROGRESS NOTES
2200: RN to page MD for orders    2204: RN to receive orders for cortrak placement. OK to remove OG tube once cortrak verified.

## 2018-02-17 NOTE — CONSULTS
"Reason for PC Consult: Advance Care Planning    Consulted by: Dr. Madhu Rouse Jr    Assessment:  General: Pt is a 62 year old male w/recent diagnosis of stage IV lung cancer w/mets to the liver, HTN, DM, GERD and CKD; admitted for respiratory failure requiring intubation. Pt is now intubated/sedated and monitored in ICU.     Dyspnea: No (Pt currently on ventilation.)  Last BM:      Pain: No (Pt able to shake his head \"no\" when asked if having pain.)  Depression: Unable to determine    Dementia: Unable to Determine     Spiritual:  Is Muslim or spirituality important for coping with this illness? Unable to determine    Has a  or spiritual provider visit been requested? No    Palliative Performance Scale: 20%    Advance Directive: None    DPOA: No    POLST: No      Code Status: Limited  (Partial; no compressions as stated in file. )    Outcome:  Palliative RN received updates from Bedside RN regarding pt's current clinical condition and family's involvement. Palliative RN placed call to pt's daughter Scarlett who states she will be available to meet this afternoon.     Updated: Bedside RN    Plan: Palliative RN to meet w/pt's daughter Scarlett at bedside, once she arrives.    Recommendations: I do not recommend an ethics or hospice consult at this time because discussion will need to be had w/family and pt before a decision is made. .    Thank you for allowing Palliative Care to participate in this patient's care. Please feel free to call [phone] with any questions or concerns.  "

## 2018-02-17 NOTE — PROGRESS NOTES
"Pharmacy Kinetics 62 y.o. male on vancomycin day # 2 2/17/2018    Received vancomycin loading dose in the ER    Indication for Treatment: Pneumonia    Pertinent history per medical record: Admitted on 2/15/2018. This appears to be a 62-year-old gentleman with known past medical history of diabetes, hypertension, GERD and JUANITO.  The patient was recently diagnosed with what is believed to be nonsmall cell lung cancer stage IV with metastatic disease to bones, specifically left pelvis and liver [facility], has a large left upper lobe mass with right-sided pulmonary nodules as well.     Other antibiotics: cefepime, Flagyl    Allergies: Pcn [penicillins] and Tape     List concerns for renal function: MAIKEL, BUN/SCr ratio > 20:1, age    Pertinent cultures to date:   2/15/18 blood cultures x2 - in process  2/15/18 tracheal aspirate, GPC, GNR    Recent Labs      02/15/18   2000  02/16/18   0555  02/16/18   0827  02/16/18   1417   WBC  14.6*  10.3  9.8  15.0*   NEUTSPOLYS  86.60*  78.10*  79.00*  76.50*   BANDSSTABS   --    --    --   1.70     Recent Labs      02/15/18   2000  02/16/18   0435  02/16/18   1535   BUN  65*  65*  60*   CREATININE  3.49*  3.50*  2.69*   ALBUMIN  3.3   --    --      Recent Labs      02/17/18   0115   VANCORANDOM  13.8     Intake/Output Summary (Last 24 hours) at 02/17/18 0253  Last data filed at 02/17/18 0200   Gross per 24 hour   Intake          2498.67 ml   Output             1575 ml   Net           923.67 ml      Blood pressure 134/65, pulse (!) 126, temperature 36.7 °C (98.1 °F), resp. rate (!) 22, height 1.854 m (6' 1\"), weight 96.5 kg (212 lb 11.9 oz), SpO2 100 %. No data recorded.      A/P   1. Vancomycin dose change: start vancomycin 20mg/kg q24hr (1900mg)  2. Next vancomycin level: Prior to the 3rd dose if remains on vancomycin (not ordered)  3. Goal trough: 16-20 mcg/ml  4. Comments: SCr decreasing, UOP adequate (not charted for a period of time since pt at comfort care status " for a period of time). Level above drawn 25 hours post dose. Will cautiously schedule q24 hour dosing. Consider level tomorrow. Day team to evaluate renal function and order level. Pharmacy will follow.    Daniel Silva, MilyD, BCPS

## 2018-02-17 NOTE — ASSESSMENT & PLAN NOTE
Anion Gap Metabolic Acidosis/lactic Acidosis  -Elevated Anion gap 15>11>9 with bicar 18>15>17  -Etiologies include -MAIKEL versus Infection/Pneumonia.

## 2018-02-17 NOTE — EEG PROGRESS NOTE
EEG 02/16/18 6:24 PM    ROUTINE ELECTROENCEPHALOGRAM REPORT      NAME: Jake Castillo    REFERRING Dr:    INDICATION: Acute hypoxic respiratory failure and altered level of   consciousness.      TECHNIQUE: 30 channel routine electroencephalogram (EEG) was performed in accordance with the international 10-20 system. The study was reviewed in bipolar and referential montages. The recording examined the patient during coma    DESCRIPTION OF THE RECORD:      Background rhythm shows nearly consistent polymorphic delta 1-3 Hz slow in the whole record.  No spike-and-wave discharges or any lateralizing abnormalities are seen. The patient remained unresponsive during this EEG test    ACTIVATION PROCEDURES:        ICTAL AND/OR INTERICTAL FINDINGS:    No focal or generalized epileptiform activity noted. No regional slowing was seen during this routine study.  No clinical events or seizures were reported or recorded during the study.      EKG: sampling of the EKG recording demonstrated regular rhythm most of the time        INTERPRETATION:      ________________________________________________________________________    This scalp EEG is consistent with diffuse nonspecific cortical dysfunction - moderate to severe    This nonspecific abnormalities could be secondary to metabolic, toxic or polypharmacy     There are no epileptiform discharges and no electrographic seizures in this record    EEG 02/16/18 6:28 PM    ________________________________________________________________________

## 2018-02-17 NOTE — PROGRESS NOTES
"Pulmonary Critical Care Progress Note        Chief Complaint: Acute hypoxic respiratory failure and altered level of   consciousness.    History of Present Illness: \"All information taken from chart review, sign out and oldest daughter at bedside, as the patient currently on full mechanical ventilatory support.  This appears to be a 62-year-old gentleman with known past medical history of diabetes, hypertension, GERD and JUANITO.  The patient was recently diagnosed with what is believed to be nonsmall cell lung cancer stage IV with metastatic disease to bones, specifically left pelvis and [facility], has a large left upper lobe mass with right-sided pulmonary nodules as well.  The patient apparently has been experiencing severe left hip pain as well as left knee pain, recently [facility] and prescribed oxycodone, which per daughter only intensified the pain without any alleviation.  He otherwise was in relatively decent health, was planning on having radiation to the left pelvis as well as treatment of the hepatic lesion followed by chemotherapy for his underlying cancer in the coming weeks.  It appears the patient was in his baseline state up until yesterday evening, at which point in time he was seen by his younger daughter's friend, who is a 25-year-old hippy and frequents the marijuana stores, and after patient isolating himself with this gentleman, he apparently received some form of medication to help treat the pain, but it is uncertain what this was.  After which he was noted to be poorly arousable from approximately 8:00 p.m. yesterday through today.  His oldest daughter went to check on him at which point in time noticing that he was not arousable, called EMS and had the patient transferred to [facility] for further evaluation.  Otherwise, there is no mention of recent acute illness or other complaints besides that mentioned.\"    Review of Systems   Unable to perform ROS: Critical illness "     Interval Events:  24 hour interval history reviewed   - Prop on, PEEP up to 12   - Neuro: Anxious   - HR: 50-170s sinus   - BP: 80-150s systolic, CVP 10-15   - GI: TF starting   - UOP: adequate   - [place]: yes   - Tm: 101   - Lines: PIV   - PPx: GI pepcid, DVT heparin   - VD #3   - CMV rate 22 ->20, 100% ->drop today, PEEP 12   - ABG: hyperoxia   - CXR (compared to prior): left opacity unchanged, right with perihilar opacities - new    Yesterday's Events              - admitted ON              - Neuro: Agitated, follows              - HR: 50s-80s SR              - BP: labile - sedation related              - GI: NPO, cortrak to be placed              - UOP: adequate              - [place]: yes              - Tm: 99.3              - Lines: CVC              - PPx: GI pepcid, DVT SCDs              - ABG: Metabolic acidosis              - CXR (compared to prior): Large left mass, tubes in good position    PFSH:  No change.    Physical Exam   Constitutional: He appears unhealthy. He has a sickly appearance. He is intubated.   HENT:   Head: Normocephalic and atraumatic.   Right Ear: External ear normal.   Left Ear: External ear normal.   Nose: Nose normal.   Mouth/Throat: Oropharynx is clear and moist.   Eyes: Conjunctivae and EOM are normal. Pupils are equal, round, and reactive to light. Right eye exhibits no discharge. Left eye exhibits no discharge.   Neck: Normal range of motion. Neck supple. No JVD present. No tracheal deviation present.   Cardiovascular: Normal rate, regular rhythm, normal heart sounds and intact distal pulses.    No murmur heard.  Pulmonary/Chest: He is intubated. He is in respiratory distress. He has decreased breath sounds (Left). He has rhonchi (L>R).   Abdominal: Soft. Bowel sounds are normal. He exhibits no distension. There is no tenderness.   Musculoskeletal: He exhibits no edema or tenderness.   Neurological:   Sedated but follows   Skin: Skin is warm. No rash noted.   Nursing note and  vitals reviewed.      Respiratory:  Jimenez Vent Mode: APVCMV, Rate (breaths/min): 22, Vt Target (mL): 480, PEEP/CPAP: 12, FiO2: 100, Control VTE (exp VT): 528  Pulse Oximetry: 100 %  Chest Tube Drains:  ImagingAvailable data reviewed   Recent Labs      02/15/18   2013   02/16/18   0437  02/16/18   1500  02/17/18   0510   [id number]  7.34*   --    --    --    --    [id number]  36.1   --    --    --    --    [id number]  80.5   --    --    --    --    [id number]  93.3   --    --    --    --    ARTHCO3  19   --    --    --    --    ARTBE  -6*   --    --    --    --    ISTATAPH   --    < >  7.384*  7.188*  7.368*   ISTATAPCO2   --    < >  28.6  38.8*  30.8   ISTATAPO2   --    < >  130*  70  175*   ISTATATCO2   --    < >  18*  16*  19*   [id number]   --    < >  99  89*  100*   ISTATARTHCO3   --    < >  17.1  14.8*  17.7   ISTATARTBE   --    < >  -7*  -13*  -7*   ISTATTEMP   --    < >  98.2 F  38.6 C  99.3 F   ISTATFIO2   --    < >  40  100  100   ISTATSPEC   --    < >  Arterial  Arterial  Arterial   ISTATAPHTC   --    --   7.387*  7.166*  7.363*   [id number]   --    --   128*  79  177*    < > = values in this interval not displayed.       HemoDynamics:  Pulse: (!) 103, Heart Rate (Monitored): (!) 106  NIBP: 145/86  CVP (mm Hg): 6 MM HG  Imaging: Available data reviewed        Neuro:  GCS Total Delmont Coma Score: 11  Imaging: Available data reviewed    Fluids:  Intake/Output       02/15/18 0700 - 02/16/18 0659 02/16/18 0700 - 02/17/18 0659 02/17/18 0700 - 02/18/18 0659      [id number] 7120-9526 Total 0700-1859 1900-0659 Total 0700-1859 1900-0659 Total       Intake    I.V.  --  3402.6 3402.6  1637.2  779.1 2416.3  --  -- --    Precedex Volume -- 11.2 11.2 135.3 -- 135.3 -- -- --    Phenylephrine Volume -- 7.7 7.7 1.9 -- 1.9 -- -- --    Propofol Volume -- 33.6 33.6 -- 54.1 54.1 -- -- --    IV Piggyback Volume (vanco) -- 500 500 -- 500 500 -- -- --    IV Piggyback Volume (flagyl) -- 100 100 -- 100 100 -- -- --    IV Volume  (bolus) -- 2000 -- -- -- -- -- --    IV Volume (NS) --  125 1625 -- -- --    Enteral  --  60 60  --  -- --  --  -- --    Free Water / Tube Flush -- 60 60 -- -- -- -- -- --    Total Intake -- 3462.6 3462.6 1637.2 779.1 2416.3 -- -- --       Output    Urine  --  600 600  1225  1600 2825  --  -- --    Indwelling Cathether --  1600 2825 -- -- --    Drains  --  300 300  --  -- --  --  -- --    Oral Gastric Tube -- 300 300 -- -- -- -- -- --    Stool  --  -- --  --  -- --  --  -- --    Number of Times Stooled -- 0 x 0 x -- -- -- -- -- --    Total Output --  1600 2825 -- -- --       Net I/O     -- 2562.6 2562.6 412.2 -820.9 -408.7 -- -- --        Weight: 97.5 kg (214 lb 15.2 oz)  Recent Labs      18   042   SODIUM  139  135  139   POTASSIUM  4.9  5.3  4.8   CHLORIDE  111  109  113*   CO2  18*  15*  17*   BUN  65*  60*  64*   CREATININE  3.50*  2.69*  3.17*   MAGNESIUM  1.9   --   1.9   PHOSPHORUS  5.2*   --   4.5   CALCIUM  8.1*  8.1*  8.1*       GI/Nutrition:  Imaging: Available data reviewed  NPO  Liver Function  Recent Labs      02/15/18   2000  02/16/18   0435  02/16/18   1535  02/17/18   042   ALTSGPT  32   --    --    --    ASTSGOT  70*   --    --    --    ALKPHOSPHAT  198*   --    --    --    TBILIRUBIN  0.4   --    --    --    GLUCOSE  124*  127*  224*  177*       Heme:  Recent Labs      18   0827  18   1417  18   0425   RBC  3.38*  4.30*  3.67*   HEMOGLOBIN  7.9*  10.2*  8.7*   HEMATOCRIT  27.4*  35.2*  29.4*   PLATELETCT  204  282  232       Infectious Disease:  Monitored Temp  Av.8 °C (100.1 °F)  Min: 37 °C (98.6 °F)  Max: 38.6 °C (101.5 °F)  Micro: antibiotics reviewed and cultures reviewed  Recent Labs      02/15/18   2000   02/16/18   0818   1417  18   0425   WBC  14.6*   < >  9.8  15.0*  13.0*   NEUTSPOLYS  86.60*   < >  79.00*  76.50*  83.10*   LYMPHOCYTES  6.20*   < >  9.80*  12.20*   8.70*   MONOCYTES  5.90   < >  8.50  5.20  6.60   EOSINOPHILS  0.60   < >  1.80  2.60  0.60   BASOPHILS  0.20   < >  0.30  1.80  0.50   ASTSGOT  70*   --    --    --    --    ALTSGPT  32   --    --    --    --    ALKPHOSPHAT  198*   --    --    --    --    TBILIRUBIN  0.4   --    --    --    --     < > = values in this interval not displayed.     Current Facility-Administered Medications   Medication Dose Frequency Provider Last Rate Last Dose   • vancomycin 1,900 mg in  mL IVPB  20 mg/kg Q24HR CHRISSIE Cook Jr..O.   Stopped at 02/17/18 0622   • acetaminophen (TYLENOL) tablet 650 mg  650 mg Q4HRS PRN Madhu Rouse Jr., CHRISSIE.O.       • senna-docusate (PERICOLACE or SENOKOT S) 8.6-50 MG per tablet 2 Tab  2 Tab BID CHRISSIE Cook Jr..O.   Stopped at 02/16/18 2145    And   • polyethylene glycol/lytes (MIRALAX) PACKET 1 Packet  1 Packet QDAY PRN CHRISSIE Cook Jr..O.        And   • magnesium hydroxide (MILK OF MAGNESIA) suspension 30 mL  30 mL QDAY PRN Madhu Rouse Jr., CHRISSIE.O.        And   • bisacodyl (DULCOLAX) suppository 10 mg  10 mg QDAY PRN Madhu Rouse Jr., D.O.       • cefepime (MAXIPIME) syringe 2 g  2 g Q12HRS CHRISSIE Cook Jr..O.   2 g at 02/16/18 2239   • famotidine (PEPCID) tablet 20 mg  20 mg DAILY CHRISSIE Cook Jr..O.        Or   • famotidine (PEPCID) injection 20 mg  20 mg DAILY Madhu Rouse Jr., D.O.       • insulin regular (HUMULIN R) injection 2-9 Units  2-9 Units Q6HRS CHRISSIE Cook Jr..O.   Stopped at 02/17/18 0600    And   • glucose 4 g chewable tablet 16 g  16 g Q15 MIN PRN CHRISSIE Cook Jr..O.        And   • dextrose 50% (D50W) injection 25 mL  25 mL Q15 MIN PRN Madhu Rouse Jr., D.O.       • chlorhexidine (PERIDEX) 0.12 % solution 15 mL  15 mL BID Madhu Rouse Jr. D.O.   15 mL at 02/16/18 2239   • ipratropium-albuterol (DUONEB) nebulizer solution 3 mL  3 mL Q2HRS PRN (RT) Madhu Rouse Jr. D.O.       • ipratropium-albuterol (DUONEB) nebulizer  solution 3 mL  3 mL Q4HRS (RT) CHRISSIE Cook Jr..O.   3 mL at 02/17/18 0701   • labetalol (NORMODYNE,TRANDATE) injection 10 mg  10 mg Q4HRS PRN Madhu Rouse Jr. D.O.       • lidocaine (XYLOCAINE) 1%  injection  1-2 mL Q30 MIN PRN Madhu Rouse Jr. D.O.       • metroNIDAZOLE (FLAGYL) IVPB 500 mg  500 mg Q8HRS Madhu Rouse Jr. D.O. 100 mL/hr at 02/17/18 0604 500 mg at 02/17/18 0604   • Respiratory Care per Protocol   Continuous RT CHRISSIE Cook Jr..O.       • MD ALERT... vancomycin per pharmacy protocol   pharmacy to dose CHRISSIE Cook Jr..O.       • furosemide (LASIX) tablet 20 mg  20 mg BID DIURETIC CHRISSIE Cook Jr..O.   20 mg at 02/17/18 0604   • propofol (DIPRIVAN) injection  0-80 mcg/kg/min Continuous Kay Lara PharmD 8.2 mL/hr at 02/17/18 0205 15 mcg/kg/min at 02/17/18 0205   • fentaNYL (SUBLIMAZE) injection 25 mcg  25 mcg Q HOUR PRN Josh Butler M.D.        Or   • fentaNYL (SUBLIMAZE) injection 50 mcg  50 mcg Q HOUR PRN Josh Butler M.D.        Or   • fentaNYL (SUBLIMAZE) injection 100 mcg  100 mcg Q HOUR PRN Josh Butler M.D.   100 mcg at 02/16/18 0077     Last reviewed on 2/16/2018 10:51 AM by Cornelia Anderson    Quality  Measures:   Labs reviewed, Medications reviewed and Radiology images reviewed   [place] catheter: Critically Ill - Requiring Accurate Measurement of Urinary Output   Central line in place: Concentrated IV drugs and Need for access       DVT prophylaxis - mechanical: SCDs   Ulcer prophylaxis: Yes   Antibiotics: Treating active infection/contamination beyond 24 hours perioperative coverage      Problems/Plan:  Altered level of consciousness after receiving unknown medication.   - Appears to be resolving   - CT negative   - EEG diffuse encephalopathy    Acute hypoxic respiratory failure   - RT/O2 protocols   - Daily and PRN ABGs   - Titration of ventilator therapy based on ABGs and patient's status   - Sedation as tolerated/indicated   -  Daily CXR   - HOB >30 degrees and peridex for VAP prevention   - Pepcid for GI prophylaxis   - SAT/SBT when able (ABCDEF Bundle)   - Early mobility   - Vent settings adjusted PEEP 12 FiO2 80    ARDS   - ARDS net protocol vent settings   - Attempt to minimize IV fluids, maintain euvolemic    Leukocytosis   - Improved   - Vanco, Maxipime, Flagyl for possible pneumonia    Acute kidney injury   - renal dose meds, avoid nephrotoxins    Hyperkalemia   - resolved    Non-Anion gap metabolic acidosis   -Hyperchloremic, limit high chloride IV fluids    Metastatic lung cancer   - with metastasis to the left pelvis and liver   - [facility]    Diabetes   - SSI, accuchecks    Chronic hypertension   - hold meds for now    Obstructive sleep apnea, compliant with CPAP.    Gastroesophageal reflux disease   - pepcid    Incomplete medical database    Discussed patient condition and risk of morbidity and/or mortality with Family, RN, RT, Pharmacy, Dietary, , [employer] [facility] resident, Charge nurse / hot rounds and Patient.      The patient remains critically ill.  Critical care time = 45 minutes in directly providing and coordinating critical care and extensive data review.  No time overlap and excludes procedures.

## 2018-02-17 NOTE — PROGRESS NOTES
Internal Medicine Interval Note  Note Author: Lorraine Mcdaniel M.D.     Name CharterJake     1955   Age/Sex 62 y.o. male   MRN [id number]   Code Status Partial Code/DNR     After 5PM or if no immediate response to page, please call for cross-coverage  Attending/Team: Dr. Stephenson  See Patient List for primary contact information  Call [phone] to page    1st Call - Day Intern (R1):   Dr Stiles 2nd Call - Day Sr. Resident (R2/R3):   Dr Mcdaniel         Reason for interval visit  (Principal Problem)   Lung cancer metastatic to bone (CMS-HCC)   Acute hypoxic respiratory failure and altered level of consciousness.  Interval Problem Daily Status Update  (24 hours)   -Acute hypoxemic respiratory distress -Repeat ABG shows pH 7.188, PCO2 38.8, Bicarb 14.8 with CXR showing prominent left parahilar opacity probably mass-with post obstructive pneumonia? and right basilar opacity(ARDS versus Pneumonia versus Pulmonary edema).   -Altered Mental Status-?Opiod overdose- UDS was positive for opiods, cannabinoids, oxycodone. CT head negative for acute pathology and/or mets.  -Nutrition: Cortrak placed today for enteral access and tube feeds per protocol.   - MAIKEL-Prerenal based on FENA- Improved with IV fluids from 3.50>2.69.  -Worsening Leukocytosis- ?Reactive versus Post obstructive Pneumonia with WBC 15K with left shift.  -Anion Gap Metabolic Acidosis/lactic Acidosis- Elevated Anion gap 15>11 with bicar 18>15(MAIKEL versus Infection versus mild DKA)  -Acute Anemia:-Drop of Hgb from 11.1>7.6>7.9>10.2- kidney dysfunction/metatstatic lung cancer with probably bone marrow suppression versus others. No visible signs of bleed.       Review of Systems   Unable to perform ROS: Intubated       Consultants/Specialty  ICU  Pulmonology    Disposition  Inpatient for altered mental status and acute hypoxemic respiratory distress    Quality Measures  Quality-Core Measures   Reviewed items::  Labs reviewed, EKG reviewed,  Medications reviewed and Radiology images reviewed  [place] catheter::  Critically Ill - Requiring Accurate Measurement of Urinary Output  DVT prophylaxis pharmacological::  Heparin          Physical Exam       Vitals:    02/16/18 1545 02/16/18 1600 02/16/18 1615 02/16/18 1630   BP:       Pulse: 99 93 92 86   Resp: (!) 21 (!) 24 (!) 21 20   Temp:       SpO2: 96% 98% 100% 100%   Weight:       Height:         Body mass index is 28.07 kg/m². Weight: 96.5 kg (212 lb 11.9 oz)  Oxygen Therapy:  Pulse Oximetry: 100 %, O2 (LPM): 3, FIO2%: 100, O2 Delivery: Ventilator    Physical Exam      Lab Data Review:         2/16/2018  4:46 PM    Recent Labs      02/15/18   2000  02/16/18   0435  02/16/18   1535   SODIUM  136  139  135   POTASSIUM  5.8*  4.9  5.3   CHLORIDE  103  111  109   CO2  18*  18*  15*   BUN  65*  65*  60*   CREATININE  3.49*  3.50*  2.69*   MAGNESIUM   --   1.9   --    PHOSPHORUS   --   5.2*   --    CALCIUM  9.7  8.1*  8.1*       Recent Labs      02/15/18   2000  02/16/18   0435  02/16/18   1535   ALTSGPT  32   --    --    ASTSGOT  70*   --    --    ALKPHOSPHAT  198*   --    --    TBILIRUBIN  0.4   --    --    GLUCOSE  124*  127*  224*       Recent Labs      02/16/18 0555 02/16/18   0827 02/16/18   1417   RBC  3.24*  3.38*  4.30*   HEMOGLOBIN  7.6*  7.9*  10.2*   HEMATOCRIT  26.0*  27.4*  35.2*   PLATELETCT  208  204  282       Recent Labs      02/15/18   2000  02/16/18   0555  02/16/18   0827 02/16/18   1417   WBC  14.6*  10.3  9.8  15.0*   NEUTSPOLYS  86.60*  78.10*  79.00*  76.50*   LYMPHOCYTES  6.20*  11.10*  9.80*  12.20*   MONOCYTES  5.90  8.80  8.50  5.20   EOSINOPHILS  0.60  1.30  1.80  2.60   BASOPHILS  0.20  0.40  0.30  1.80   ASTSGOT  70*   --    --    --    ALTSGPT  32   --    --    --    ALKPHOSPHAT  198*   --    --    --    TBILIRUBIN  0.4   --    --    --            Assessment/Plan     * Lung cancer metastatic to bone (CMS-HCC)   Assessment & Plan    Had sudden onset of hoarseness on  12/25/17.  He was worked up, bronchoscopy was done on 01/25/2018.  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  PET Scan done 01/18/2018, : Wide spread soft tissue disease including ROSHNI lung mass, mediastinal adenopathy with liver and osseous mets noted.  [facility]  [facility] head negative for metastatic disease.  CT chest/abdomen/pelvis: 1.  Large LEFT upper lobe lung mass extending from hilum to chest wall anteriorly, measuring approximately 9 cm with probable associated consolidated lung. LEFT hilar and mediastinal adenopathy, metastatic. Apparent compression of LEFT lower lobe bronchus.  Nodules and ill-defined opacity in the RIGHT lung, likely metastatic.  Edematous appearing LEFT kidney of uncertain significance.  Mass is not excluded.  Limited by lack of IV contrast.  -Metastatic lung cancer, presumed with metastasis to the left pelvis and liver          Acute respiratory failure with hypoxia (CMS-HCC)   Assessment & Plan    -Presented with AMS, on 4L of oxygen, ABG showed PH of 7.26, CO2 38.6, PO2 118, Hco3 17.7.  -He was then intubated following worsening respiratory function function   -Repeat ABG shows pH 7.188, PCO2 38.8, Bicarb 14.8 with CXR showing prominent left parahilar opacity probably mass-with post obstructive pneumonia? and right basilar opacity(ARDS versus Pneumonia versus Pulmonary edema).   -Continue with Ceftriaxone plus Metronidazole.  -RT protocol, duo-nebs, 02 supplementary therapy.            Anemia   Assessment & Plan    -Acute Anemia:-Drop of Hgb from 11.1>7.6>7.9>10.2- kidney dysfunction/metatstatic lung cancer with probably bone marrow suppression versus others. No visible signs of bleed.         Increased anion gap metabolic acidosis   Assessment & Plan    Anion Gap Metabolic Acidosis/lactic Acidosis  -Elevated Anion gap 15>11 with bicar 18>15  -Etiologies include -MAIKEL versus Infection versus mild DKA.            Acute kidney failure  (CMS-HCC)   Assessment & Plan    -Presented with Creatinine of 3.49 and BUN 65.  -FENA <1%., UA negative for infectious process.  -CT abdomen/pelvis shows Edematous appearing LEFT kidney of uncertain significance.  Mass is not excluded.  -Improved with IV fluids from 3.50>2.69.  -Continue to monitor.             Sepsis (CMS-HCC)   Assessment & Plan    Presented with SOB and altered mental status  SIRS 2/4 , WBC 14.6 and    He became hypotensive which did not respond to fluid bolus and so he was started on pressors, phenylephrine.  UA non concerning for UTI, CXR concerning for probably post obstructive Pneumonia ?   Lactate 1.7 >1.4>2.2  Procalcitonin 0.55    Plan   -IV fluids.  On ceftriaxone and metronidazole.  Blood culture x 2 . Follow up.            Altered mental status   Assessment & Plan    He was reported to have received some pills from an unknown person following which he became altered, with worsening of his mental state and shortness of breath.  He was brought to the ED by EMS.  DDx : infective, metabolic, hypoglycemia.  Urine drug screen positive for Opiods, cannabinoids and oxycodone..  He was then intubated due to worsening respiratory function  S/p one dose of Narcan  CT head for acute hemorrhage or metastatic disease.            Type 2 diabetes mellitus (CMS-HCC)   Assessment & Plan    -On Glipizide 10mg BID,Insuline glargine 60 units BID, Exenatide.   -HBA1C 6.7    Plan  ISS, accu checks, hypoglycemia protocol.          History of gastroesophageal reflux (GERD)   Assessment & Plan    Patient is on Esomerazole 40mg BID        JUANITO (obstructive sleep apnea)   Assessment & Plan    On CPAP at home        Essential hypertension   Assessment & Plan    Hx of Hypertension,  Presented with a BP of 134/65mHg. However BP dropped.  On Lisinopril 20mg and diltiazem  120mg      Plan   Hold antihypertensives due to hypotension in the setting of sepsis        Hyperkalemia   Assessment & Plan    Presented with  serum K of 5.8, no EKG changes,  Was given IV calcuim gluconate.  Could be 2/2 lisniopril which patient takes for HTN    Plan   Repeat serum BMP.  will continue to monitor.

## 2018-02-18 NOTE — PROGRESS NOTES
"Pulmonary Critical Care Progress Note        Chief Complaint: Acute hypoxic respiratory failure and altered level of   consciousness.    History of Present Illness: \"All information taken from chart review, sign out and oldest daughter at bedside, as the patient currently on full mechanical ventilatory support.  This appears to be a 62-year-old gentleman with known past medical history of diabetes, hypertension, GERD and JUANITO.  The patient was recently diagnosed with what is believed to be nonsmall cell lung cancer stage IV with metastatic disease to bones, specifically left pelvis and liver [facility], has a large left upper lobe mass with right-sided pulmonary nodules as well.  The patient apparently has been experiencing severe left hip pain as well as left knee pain, recently [facility] and prescribed oxycodone, which per daughter only intensified the pain without any alleviation.  He otherwise was in relatively decent health, was planning on having radiation to the left pelvis as well as treatment of the hepatic lesion followed by chemotherapy for his underlying cancer in the coming weeks.  It appears the patient was in his baseline state up until yesterday evening, at which point in time he was seen by his younger daughter's friend, who is a 25-year-old hippy and frequents the marijuana stores, and after patient isolating himself with this gentleman, he apparently received some form of medication to help treat the pain, but it is uncertain what this was.  After which he was noted to be poorly arousable from approximately 8:00 p.m. yesterday through today.  His oldest daughter went to check on him at which point in time noticing that he was not arousable, called EMS and had the patient transferred to [facility] for further evaluation.  Otherwise, there is no mention of recent acute illness or other complaints besides that mentioned.\"    Review of Systems   Unable to perform ROS: Intubated "     Interval Events:  24 hour interval history reviewed   -Hypoxic overnight   - Neuro: anxious   - HR: 110s-130s   - BP: 130s-160s systolic   - GI: TF at goal, no BM   - UOP: adequate   - [place]: yes   - Tm: 101.7   - Lines: CVC   - PPx: GI pepcid, DVT heparin   - VD #4, PEEP 12, PO2 78   - ABG: hypoxia   - CXR (compared to prior): left opacity unchanged, right with perihilar opacities worsen    Yesterday's Events    - Prop on, PEEP up to 12   - Neuro: Anxious   - HR: 50-170s sinus   - BP: 80-150s systolic, CVP 10-15   - GI: TF starting   - UOP: adequate   - [place]: yes   - Tm: 101   - Lines: PIV   - PPx: GI pepcid, DVT heparin   - VD #3   - CMV rate 22 ->20, 100% ->drop today, PEEP 12   - ABG: hyperoxia   - CXR (compared to prior): left opacity unchanged, right with perihilar opacities - new    PFSH:  No change.    Physical Exam   Constitutional: He appears unhealthy. He has a sickly appearance. He is intubated.   HENT:   Head: Normocephalic and atraumatic.   Right Ear: External ear normal.   Left Ear: External ear normal.   Nose: Nose normal.   Mouth/Throat: Oropharynx is clear and moist.   Eyes: Conjunctivae and EOM are normal. Right eye exhibits no discharge. Left eye exhibits no discharge.   Neck: Normal range of motion. Neck supple. No JVD present. No tracheal deviation present.   Cardiovascular: Normal rate, regular rhythm, normal heart sounds and intact distal pulses.    No murmur heard.  Pulmonary/Chest: He is intubated. He is in respiratory distress. He has decreased breath sounds (Left). He has rhonchi (L>R).   Abdominal: Soft. Bowel sounds are normal. He exhibits no distension. There is no tenderness.   Musculoskeletal: He exhibits no edema or tenderness.   Neurological: He is alert.   follows   Skin: Skin is warm. No rash noted.   Nursing note and vitals reviewed.      Respiratory:  Jimenez Vent Mode: APVCMV, Rate (breaths/min): 20, Vt Target (mL): 480, PEEP/CPAP: 12, FiO2: 80, Control VTE (exp VT):  632  Pulse Oximetry: 97 %  Chest Tube Drains:  ImagingAvailable data reviewed   Recent Labs      02/15/18   2013   02/17/18   0510  02/17/18   1821  02/18/18   0457   [id number]  7.34*   --    --    --    --    [id number]  36.1   --    --    --    --    [id number]  80.5   --    --    --    --    [id number]  93.3   --    --    --    --    ARTHCO3  19   --    --    --    --    ARTBE  -6*   --    --    --    --    ISTATAPH   --    < >  7.368*  7.343*  7.411   ISTATAPCO2   --    < >  30.8  34.0  31.5   ISTATAPO2   --    < >  175*  123*  78   ISTATATCO2   --    < >  19*  19*  21   [id number]   --    < >  100*  99  96   ISTATARTHCO3   --    < >  17.7  18.5  20.0   ISTATARTBE   --    < >  -7*  -7*  -4   ISTATTEMP   --    < >  99.3 F  101.7 F  100.6 F   ISTATFIO2   --    < >  100  80  80   ISTATSPEC   --    < >  Arterial  Arterial  Arterial   ISTATAPHTC   --    < >  7.363*  7.319*  7.395*   [id number]   --    < >  177*  135*  84    < > = values in this interval not displayed.       HemoDynamics:  Pulse: (!) 122, Heart Rate (Monitored): (!) 117  NIBP: 155/77  CVP (mm Hg): (!) 8 MM HG  Imaging: Available data reviewed        Neuro:  GCS Total [place] Coma Score: 11  Imaging: Available data reviewed    Fluids:  Intake/Output       02/16/18 0700 - 02/17/18 0659 02/17/18 0700 - 02/18/18 0659 02/18/18 0700 - 02/19/18 0659      8619-3650 7610-5582 Total 8845-2282 0594-2955 Total 4927-1149 5912-7142 Total       Intake    I.V.  1937.2  779.1 2716.3  203.5  801.6 1005.1  --  -- --    Precedex Volume 135.3 -- 135.3 -- -- -- -- -- --    Phenylephrine Volume 1.9 -- 1.9 -- -- -- -- -- --    Propofol Volume -- 54.1 54.1 203.5 351.6 555.1 -- -- --    IV Piggyback Volume (vanco) -- 500 500 -- 250 250 -- -- --    IV Piggyback Volume (flagyl) -- 100 100 -- 200 200 -- -- --    IV Volume (NS) 3911 214 4062 -- -- -- -- -- --    Other  --  -- --  60  60 120  --  -- --    Medications (P.O./ Enteral Liquids) -- -- -- 60 60 120 -- -- --    Enteral  --   -- --  260  720 980  --  -- --    Enteral Volume -- -- -- 200 600 800 -- -- --    Free Water / Tube Flush -- -- -- 60 120 180 -- -- --    Total Intake 1937.2 779.1 2716.3 523.5 1581.6 2105.1 -- -- --       Output    Urine  1425  1600 3025  3250  1200 4450  --  -- --    Indwelling Cathether 1425 1600 3025 3250 1200 4450 -- -- --    Drains  --  -- --  0  -- 0  --  -- --    Residual Amount (ml) (Discarded) -- -- -- 0 -- 0 -- -- --    Total Output 1425 1600 3025 3250 1200 4450 -- -- --       Net I/O     512.2 -820.9 -308.7 -2726.5 381.6 -2344.9 -- -- --        Weight: 96.1 kg (211 lb 13.8 oz)  Recent Labs      18   15318   04218   0400   SODIUM  139  135  139  140   POTASSIUM  4.9  5.3  4.8  4.5   CHLORIDE  111  109  113*  110   CO2  18*  15*  17*  19*   BUN  65*  60*  64*  51*   CREATININE  3.50*  2.69*  3.17*  2.30*   MAGNESIUM  1.9   --   1.9  2.1   PHOSPHORUS  5.2*   --   4.5  3.7   CALCIUM  8.1*  8.1*  8.1*  9.2       GI/Nutrition:  Imaging: Available data reviewed  NPO  Liver Function  Recent Labs      02/15/18   2000   02/16/18   1535  18   0425  18   0400   ALTSGPT  32   --    --    --    --    ASTSGOT  70*   --    --    --    --    ALKPHOSPHAT  198*   --    --    --    --    TBILIRUBIN  0.4   --    --    --    --    GLUCOSE  124*   < >  224*  177*  201*    < > = values in this interval not displayed.       Heme:  Recent Labs      18   1417  18   0425  18   0400   RBC  4.30*  3.67*  3.51*   HEMOGLOBIN  10.2*  8.7*  8.5*   HEMATOCRIT  35.2*  29.4*  27.7*   PLATELETCT  282  232  202       Infectious Disease:  Monitored Temp  Av.1 °C (100.5 °F)  Min: 37.4 °C (99.3 °F)  Max: 38.7 °C (101.7 °F)  Micro: antibiotics reviewed and cultures reviewed  Recent Labs      02/15/18   2000   02/16/18   1417  18   0425  18   0400   WBC  14.6*   < >  15.0*  13.0*  11.5*   NEUTSPOLYS  86.60*   < >  76.50*  83.10*  79.60*   LYMPHOCYTES  6.20*   <  >  12.20*  8.70*  8.20*   MONOCYTES  5.90   < >  5.20  6.60  7.30   EOSINOPHILS  0.60   < >  2.60  0.60  3.60   BASOPHILS  0.20   < >  1.80  0.50  0.50   ASTSGOT  70*   --    --    --    --    ALTSGPT  32   --    --    --    --    ALKPHOSPHAT  198*   --    --    --    --    TBILIRUBIN  0.4   --    --    --    --     < > = values in this interval not displayed.     Current Facility-Administered Medications   Medication Dose Frequency Provider Last Rate Last Dose   • vancomycin 1,500 mg in  mL IVPB  15 mg/kg Q24HR Madhu Rouse Jr., D.O.   Stopped at 02/18/18 0747   • heparin injection 5,000 Units  5,000 Units Q8HRS Lorraine Mcdaniel M.D.   5,000 Units at 02/18/18 0547   • acetaminophen (TYLENOL) tablet 650 mg  650 mg Q4HRS PRN Madhu Rouse Jr., D.O.   650 mg at 02/18/18 0334   • senna-docusate (PERICOLACE or SENOKOT S) 8.6-50 MG per tablet 2 Tab  2 Tab BID CHRISSIE Cook Jr..O.   Stopped at 02/17/18 2100    And   • polyethylene glycol/lytes (MIRALAX) PACKET 1 Packet  1 Packet QDAY PRN Madhu Rouse Jr., D.O.   1 Packet at 02/17/18 2105    And   • magnesium hydroxide (MILK OF MAGNESIA) suspension 30 mL  30 mL QDAY PRN Madhu Rouse Jr., D.O.        And   • bisacodyl (DULCOLAX) suppository 10 mg  10 mg QDAY PRN Madhu Rouse Jr., D.O.       • cefepime (MAXIPIME) syringe 2 g  2 g Q12HRS Madhu Rouse Jr. D.O.   2 g at 02/17/18 2107   • famotidine (PEPCID) tablet 20 mg  20 mg DAILY Madhu Rouse Jr., D.O.   20 mg at 02/17/18 0752    Or   • famotidine (PEPCID) injection 20 mg  20 mg DAILY Madhu Rouse Jr., D.O.       • insulin regular (HUMULIN R) injection 2-9 Units  2-9 Units Q6HRS CHRISSIE Cook Jr..O.   3 Units at 02/18/18 0546    And   • glucose 4 g chewable tablet 16 g  16 g Q15 MIN PRN Madhu Rouse Jr., D.O.        And   • dextrose 50% (D50W) injection 25 mL  25 mL Q15 MIN PRN Madhu Rouse Jr., D.O.       • chlorhexidine (PERIDEX) 0.12 % solution 15 mL  15 mL BID Madhu Rouse  CHRISSIE Sky.O.   15 mL at 02/17/18 2105   • ipratropium-albuterol (DUONEB) nebulizer solution 3 mL  3 mL Q2HRS PRN (RT) CHRISSIE Cook Jr..O.       • ipratropium-albuterol (DUONEB) nebulizer solution 3 mL  3 mL Q4HRS (RT) CHRISSIE Cook Jr..O.   3 mL at 02/18/18 0641   • labetalol (NORMODYNE,TRANDATE) injection 10 mg  10 mg Q4HRS PRN CHRISSIE Cook Jr..O.       • lidocaine (XYLOCAINE) 1%  injection  1-2 mL Q30 MIN PRN CHRISSIE Cook Jr..O.       • metroNIDAZOLE (FLAGYL) IVPB 500 mg  500 mg Q8HRS CHRISSIE Cook Jr..OAbebe   Stopped at 02/18/18 0647   • Respiratory Care per Protocol   Continuous RT CHRISSIE Cook Jr..GO.       • MD ALERT... vancomycin per pharmacy protocol   pharmacy to dose CHRISSIE Cook Jr..GO.       • furosemide (LASIX) tablet 20 mg  20 mg BID DIURETIC CHRISSIE Cook Jr..O.   20 mg at 02/18/18 0547   • propofol (DIPRIVAN) injection  0-80 mcg/kg/min Continuous Kay Lara PharmD 29.9 mL/hr at 02/18/18 0554 55 mcg/kg/min at 02/18/18 0554   • fentaNYL (SUBLIMAZE) injection 25 mcg  25 mcg Q HOUR PRN Josh Butler M.D.        Or   • fentaNYL (SUBLIMAZE) injection 50 mcg  50 mcg Q HOUR PRN Josh Butler M.D.   50 mcg at 02/18/18 0042    Or   • fentaNYL (SUBLIMAZE) injection 100 mcg  100 mcg Q HOUR PRN Josh Butler M.D.   Stopped at 02/17/18 1737     Last reviewed on 2/16/2018 10:51 AM by Cornelia Anderson    Quality  Measures:  Labs reviewed, Medications reviewed and Radiology images reviewed  [place] catheter: Critically Ill - Requiring Accurate Measurement of Urinary Output  Central line in place: Concentrated IV drugs and Need for access      DVT prophylaxis - mechanical: SCDs  Ulcer prophylaxis: Yes  Antibiotics: Treating active infection/contamination beyond 24 hours perioperative coverage      Problems/Plan:  Altered level of consciousness after receiving unknown medication.   -improving   - CT negative   - EEG diffuse encephalopathy     Acute hypoxic  respiratory failure   - RT/O2 protocols   - Daily and PRN ABGs   - Titration of ventilator therapy based on ABGs and patient's status   - Sedation as tolerated/indicated   - Daily CXR   - HOB >30 degrees and peridex for VAP prevention   - Pepcid for GI prophylaxis   - SAT/SBT when able (ABCDEF Bundle)   - Early mobility              - remains unable to wean ventilator settings     ARDS   - ARDS net protocol vent settings   - Attempt to minimize IV fluids, maintain euvolemic     Leukocytosis   - Improved   - Vanco, Maxipime, Flagyl for possible pneumonia     Acute kidney injury   - renal dose meds, avoid nephrotoxins   - Stable     Hyperkalemia   - resolved     Non-Anion gap metabolic acidosis   -Improved, limit high chloride IV fluids     Metastatic lung cancer   - with metastasis to the left pelvis and liver   - [facility]   - Poor prognosis given stage IV non-small cell cancer     Diabetes   - SSI, accuchecks     Chronic hypertension   - hold meds for now     Obstructive sleep apnea, compliant with CPAP.     Gastroesophageal reflux disease   - pepcid     Incomplete medical database    Discussed patient condition and risk of morbidity and/or mortality with Family, RN, RT, Pharmacy, Dietary, , [employer] [facility] resident, Charge nurse / hot rounds and Patient.      The patient remains critically ill.  Critical care time = 32 minutes in directly providing and coordinating critical care and extensive data review.  No time overlap and excludes procedures.      ADDENDUM:  Patient's mother and 2 daughters have arrived at bedside and are requesting comfort care for Mr. Castillo. Comfort care order set was placed, analgesia was given and the patient was extubated, he dieded peacefully with his family by bedside.

## 2018-02-18 NOTE — WOUND TEAM
In to see pt for wounds to R shoulder and sacrum/buttocks. Discussion with RN who asked that assessment wait, since pt having some pain and may become comfort care. Discussion about wounds,  Pt has mepilex in place. Will follow up tomorrow.

## 2018-02-18 NOTE — PALLIATIVE CARE
"Palliative Care follow-up  Meeting with the patient's family at bedside (S132/00).  Patient remains intubated, sedated but agitated with verbal stimuli.  At bedside is Scarlett Gallegos (daughter [phone]), Jyoti Benz (mother [phone]), brother, grandson, DIL, JD.  Samantha, daughter, was contacted by phone and reviewed our conversation.    Scarlett was able to presented that patient's recent past history, treatment he was \"looking forward to receiving\", excruciating pain and the patient's wishes \"if he could not have pain free, quality time\".  Family all agree that the patient would not want to survive on machines.    We discussed one way extubation with medications for anxiety, SOB, pain and no escalation of care as the patient declines vs a rally \"if God wants\".  We discussed the many tests, procedures, machines, labs that we could continue on the patient resulting in no benefit or quality of life.  Family all stated understanding and agreed to one way extubation, comfort measure with no excalation of care.  [facility]  Phone: [phone]  Fax: [phone]    Plan: Comfort Care  Updated: Alivia Mace, RN and Palliative Care team  Thank you for allowing Palliative Care to participate in this patient's care. Please feel free to call [phone] with any questions or concerns.  "

## 2018-02-18 NOTE — CARE PLAN
Problem: Safety  Goal: Will remain free from injury  Outcome: PROGRESSING AS EXPECTED  No injuries this admission    Problem: Bowel/Gastric:  Goal: Normal bowel function is maintained or improved  Outcome: PROGRESSING SLOWER THAN EXPECTED  Bowel care given    Problem: Skin Integrity  Goal: Risk for impaired skin integrity will decrease  Outcome: PROGRESSING AS EXPECTED  Frequent skin assessment performed

## 2018-02-18 NOTE — PROGRESS NOTES
Internal Medicine Interval Note  Note Author: Henry Stiles M.D.     Name CharterJake     1955   Age/Sex 62 y.o. male   MRN [id number]   Code Status Partial Code/DNR     After 5PM or if no immediate response to page, please call for cross-coverage  Attending/Team: Dr. Stephenson  See Patient List for primary contact information  Call [phone] to page    1st Call - Day Intern (R1):   Dr Stiles 2nd Call - Day Sr. Resident (R2/R3):   Dr Mcdaniel         Reason for interval visit  (Principal Problem)   Lung cancer metastatic to bone (CMS-HCC)   Acute hypoxic respiratory failure and altered level of consciousness.      Interval Problem Daily Status Update  (24 hours)   - Bronchoscopy yesterday- no organisms seen on gram stain. Cultures pending  - Tube feeds per protocol  - Discussion of goals of care with family today      Review of Systems   Unable to perform ROS: Intubated       Consultants/Specialty  ICU  Pulmonology    Disposition  Inpatient for altered mental status and acute hypoxemic respiratory distress    Quality Measures  Quality-Core Measures   Reviewed items::  Labs reviewed, EKG reviewed, Medications reviewed and Radiology images reviewed  [place] catheter::  Critically Ill - Requiring Accurate Measurement of Urinary Output  DVT prophylaxis pharmacological::  Heparin          Physical Exam       Vitals:    18 0942 18 1000 18 1100 18 1200   BP:       Pulse:  (!) 127 (!) 120 (!) 119   Resp:  (!) 29 (!) 27 (!) 26   Temp:       SpO2: 95% 97% 97% 97%   Weight:       Height:         Body mass index is 27.95 kg/m². Weight: 96.1 kg (211 lb 13.8 oz)  Oxygen Therapy:  Pulse Oximetry: 97 %, FIO2%: 70, O2 Delivery: Ventilator    Physical Exam   Constitutional:   Intubated and sedated   HENT:   Head: Normocephalic.   Eyes: Pupils are equal, round, and reactive to light.   Neck: No JVD present.   Cardiovascular: Regular rhythm and normal heart sounds.    Tachycardic    Pulmonary/Chest: No stridor.   Mechanical ventilation   Abdominal: Soft. Bowel sounds are normal. He exhibits no distension. There is no tenderness.   Neurological:   Sedated         Lab Data Review:         2/16/2018  4:46 PM    Recent Labs      02/16/18 0435 02/16/18 1535 02/17/18 0425 02/18/18   0400   SODIUM  139  135  139  140   POTASSIUM  4.9  5.3  4.8  4.5   CHLORIDE  111  109  113*  110   CO2  18*  15*  17*  19*   BUN  65*  60*  64*  51*   CREATININE  3.50*  2.69*  3.17*  2.30*   MAGNESIUM  1.9   --   1.9  2.1   PHOSPHORUS  5.2*   --   4.5  3.7   CALCIUM  8.1*  8.1*  8.1*  9.2       Recent Labs      02/15/18   2000   02/16/18   1535  02/17/18   0425  02/18/18   0400   ALTSGPT  32   --    --    --    --    ASTSGOT  70*   --    --    --    --    ALKPHOSPHAT  198*   --    --    --    --    TBILIRUBIN  0.4   --    --    --    --    GLUCOSE  124*   < >  224*  177*  201*    < > = values in this interval not displayed.       Recent Labs      02/16/18 1417 02/17/18 0425 02/18/18   0400   RBC  4.30*  3.67*  3.51*   HEMOGLOBIN  10.2*  8.7*  8.5*   HEMATOCRIT  35.2*  29.4*  27.7*   PLATELETCT  282  232  202       Recent Labs      02/15/18   2000   02/16/18   1417  02/17/18   0425  02/18/18   0400   WBC  14.6*   < >  15.0*  13.0*  11.5*   NEUTSPOLYS  86.60*   < >  76.50*  83.10*  79.60*   LYMPHOCYTES  6.20*   < >  12.20*  8.70*  8.20*   MONOCYTES  5.90   < >  5.20  6.60  7.30   EOSINOPHILS  0.60   < >  2.60  0.60  3.60   BASOPHILS  0.20   < >  1.80  0.50  0.50   ASTSGOT  70*   --    --    --    --    ALTSGPT  32   --    --    --    --    ALKPHOSPHAT  198*   --    --    --    --    TBILIRUBIN  0.4   --    --    --    --     < > = values in this interval not displayed.           Assessment/Plan     * Lung cancer metastatic to bone (CMS-HCC)   Assessment & Plan    Had sudden onset of hoarseness on 12/25/17.  He was worked up, bronchoscopy was done on 01/25/2018.  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  PET Scan done 01/18/2018, : Wide spread soft tissue disease including ROSHNI lung mass, mediastinal adenopathy with liver and osseous mets noted.  [facility]  [facility] head negative for metastatic disease.  CT chest/abdomen/pelvis: 1.  Large LEFT upper lobe lung mass extending from hilum to chest wall anteriorly, measuring approximately 9 cm with probable associated consolidated lung. LEFT hilar and mediastinal adenopathy, metastatic. Apparent compression of LEFT lower lobe bronchus.  Nodules and ill-defined opacity in the RIGHT lung, likely metastatic.  Edematous appearing LEFT kidney of uncertain significance.  Mass is not excluded.  Limited by lack of IV contrast.  -Metastatic lung cancer, presumed with metastasis to the left pelvis and liver          Acute respiratory failure with hypoxia (CMS-HCC)   Assessment & Plan    -Presented with AMS, on 4L of oxygen, ABG showed PH of 7.26, CO2 38.6, PO2 118, Hco3 17.7.  -He was then intubated following worsening respiratory function function   - CXR shows prominent left parahilar opacity probably mass-with post obstructive pneumonia? and right basilar opacity(ARDS versus Pneumonia versus Pulmonary edema).   - ARDS in the setting of sepsis:IV lasix 20 mg BID.   - ?Post obstructive Pneumonia with WBC 15K>13k>11.5   - Bronchoscopy done yesterday- gram stain- no organisms. Cultures pending  -RT protocol, duo-nebs, 02 supplementary therapy.     - continue vancomycin, cefepime and flagyl        Anemia   Assessment & Plan    -Acute Anemia:-Drop of Hgb from 11.1>7.6>7.9>10.2>8.7>8.5- kidney dysfunction/metatstatic lung cancer with probably bone marrow suppression versus others. No visible signs of bleed.         Increased anion gap metabolic acidosis   Assessment & Plan    Anion Gap Metabolic Acidosis/lactic Acidosis  -Elevated Anion gap 15>11>9 with bicar 18>15>17  -Etiologies include -MAIKEL versus Infection/Pneumonia.             Acute  kidney failure (CMS-HCC)   Assessment & Plan    -Presented with Creatinine of 3.49 and BUN 65.  -FENA <1%., UA negative for infectious process.  -CT abdomen/pelvis shows Edematous appearing LEFT kidney of uncertain significance.  Mass is not excluded.  -Improved with IV fluids from 3.50>2.69>3.17>2.30  -Continue to monitor.   -Avoid NSAIDs, nephrotoxins.           Sepsis (CMS-HCC)   Assessment & Plan    Presented with SOB and altered mental status  SIRS 2/4 , WBC 14.6 and    He became hypotensive which did not respond to fluid bolus and so he was started on pressors, phenylephrine.  UA non concerning for UTI, CXR concerning for probably post obstructive Pneumonia ?   Lactate 1.7 >1.4>2.2  Procalcitonin 0.55  Blood culture x 2 . Follow up.  S/p fluids.    Plan:  -Continue with Vancomycin plus Cefepime and Flagyl            Altered mental status   Assessment & Plan    He was reported to have received some pills from an unknown person following which he became altered, with worsening of his mental state and shortness of breath.  He was brought to the ED by EMS.  DDx : infective, metabolic, hypoglycemia.  Urine drug screen positive for Opiods, cannabinoids and oxycodone..  He was then intubated due to worsening respiratory function  S/p one dose of Narcan  CT head - no evidence of acute hemorrhage or metastatic disease.            Type 2 diabetes mellitus (CMS-HCC)   Assessment & Plan    -On Glipizide 10mg BID,Insuline glargine 60 units BID, Exenatide.   -HBA1C 6.7    Plan  ISS, accu checks, hypoglycemia protocol.          History of gastroesophageal reflux (GERD)   Assessment & Plan    Patient is on Esomerazole 40mg BID        JUANITO (obstructive sleep apnea)   Assessment & Plan    On CPAP at home        Essential hypertension   Assessment & Plan    Hx of Hypertension,  Presented with a BP of 134/65mHg. However BP dropped.  On Lisinopril 20mg and diltiazem 120mg      Plan   Hold antihypertensives due to hypotension  in the setting of sepsis        Hyperkalemia   Assessment & Plan    Resolved  Presented with serum K of 5.8, no EKG changes,  Was given IV calcuim gluconate.  Could be 2/2 lisniopril which patient takes for HTN  will continue to monitor.

## 2018-02-18 NOTE — CARE PLAN
Problem: Ventilation Defect:  Goal: Ability to achieve and maintain unassisted ventilation or tolerate decreased levels of ventilator support  Outcome: NOT MET  Pt extubated to comfort care

## 2018-02-18 NOTE — FLOWSHEET NOTE
Adult Ventilation Update    Total Vent Days: 3    Patient Lines/Drains/Airways Status    Active Airway     Name: Placement date: Placement time: Site: Days:    Airway Group ET Tube Oral 7.5 02/15/18   2053   Oral   1                                Static Compliance (ml / cm H2O): 79 (02/17/18 1522)    Patient failed trials because of    Barriers to SBT    Length of Weaning Trial               ASV Inspiratory Pressures            Cough: Moist;Productive (02/17/18 1522)  Sputum Amount: Small (02/17/18 1600)  Sputum Color: Tan;Yellow;Pink Tinged (02/17/18 1600)  Sputum Consistency: Thick;Thin (02/17/18 1600)    Mobility Group  Activity Performed: Unable to mobilize (02/17/18 1600)  Pt Calls for Assistance: No (02/16/18 0715)  Reason Not Mobilized: Unstable condition (02/17/18 1600)    Events/Summary/Plan: Pt. resting on vent at this time. (02/17/18 1522)

## 2018-02-18 NOTE — PROGRESS NOTES
Pharmacy Kinetics Addendum:    62 y.o. male on vancomycin day # 4 2/18/2018    Currently on Vancomycin 1900 mg iv q24hr - held and random level obtained    Indication for Treatment: Pneumonia    Recent Labs      02/15/18   2000  02/16/18   0435  02/16/18   1535  02/17/18   0425  02/18/18   0400   BUN  65*  65*  60*  64*  51*   CREATININE  3.49*  3.50*  2.69*  3.17*  2.30*   ALBUMIN  3.3   --    --    --    --      Recent Labs      02/17/18   0115  02/18/18   0400   VANCORANDOM  13.8  18.9       A/P   1. Vancomycin dose change: Decrease dose to 1500mg q24 hour (~15mg/kg)  2. Next vancomycin level: 2-3 days or sooner if clinically indicated  3. Goal trough: 16-20 mcg/ml  4. Comments: Renal function improved on AM labs. UOP adequate. 24 Level after the 2nd dose. Expect vanco will continue to accumulate. Decreased dose, expect a trough of ~16-17 on this dose. Cultures with GNR. Narrow therapy as able.    Daniel Silva, PharmD, BCPS

## 2018-02-18 NOTE — PALLIATIVE CARE
"Palliative Care follow-up  Palliative RN met w/pt's daughter Scarlett at bedside. Clinical update provided by Bedside RN and all questions answered. Palliative role/support introduced to Scarlett and contact information given. At this time, Scarlett states the family is in agreement to honor pt's desire for further aggressive treatment. She reports that they would like to \"wait the week out\" to see if pt's clinical progress improves. Scarlett also confirms that pt is a devout [employer]ars[employer] and she feels pt will benefit from daily  visits. Palliative RN has left message w/.    Updated: Bedside RN    Plan: Decisions will be made based on direction of pt's clinical progress.     Thank you for allowing Palliative Care to participate in this patient's care. Please feel free to call [phone] with any questions or concerns.  "

## 2018-02-18 NOTE — DIETARY
Nutrition Services: Update     Pt is on day 3 of admit.  He is remains sedated with propofol on the vent.  He con't to receive nutrition support via cortrak.  Currently receiving Diabetisource AC at 50 mL/hr, goal rate os 75 mL/hr.  TF at goal + propofol is providing an excessive amount of kcals. Propofol is currently at 31.8 mL/hr which is providing ~840 kcals/day.     While pt is on propofol, will change TF to a specialized, high-protein and CHO controlled formula; Peptamen Intense VHP.      Recommendations:   · On propofol: Provide peptamen Intense VHP at goal rate of 60 mL/hr which will provide 1440 kcals (+kcals from propofol), 132 gm protein and 1210 mL free water per day   · Off propofol: change TF back to original goal of Diabetisource AC at goal rate of 75 mL/hr which will provide 2160 kcals, 108 gm protein, 1460 mL free water per day     RD following

## 2018-02-18 NOTE — RESPIRATORY CARE
Adult Ventilation Update    Total Vent Days: 4    Patient Lines/Drains/Airways Status    Active Airway     Name: Placement date: Placement time: Site: Days:    Airway Group ET Tube Oral 7.5 02/15/18   2053   Oral   2                Static Compliance (ml / cm H2O): 93 (02/17/18 1905)    Patient failed trials because of Barriers to Wean: FiO2 >60% or PEEP >10 CM H2O (02/17/18 2000)  Barriers to SBT      Sputum/Suction:  Cough: Moist;Productive (02/17/18 2324)  Sputum Amount: Small (02/17/18 2324)  Sputum Color: Yellow (slight blood ) (02/17/18 2324)  Sputum Consistency: Thick (02/17/18 2324)    Mobility Group  Activity Performed: Unable to mobilize (02/17/18 2000)  Pt Calls for Assistance: No (02/16/18 0715)  Reason Not Mobilized: Unstable condition (PEEP 12) (02/17/18 2000)

## 2018-02-18 NOTE — PROCEDURES
Procedure Note    Date: 2/17/2018  Time: 12:01PM    Procedure: Bronchoscopy    Indication: Respiratory failure, query pneumonia    Consent: Informed consent obtained from patient or designated decision maker after explaining the benefits/risks of the procedure including but not limited to bleeding, infection, airway trauma or loss therof, pneumothorax/hemothorax, arrythmia, or death. Patient or surrogate expressed understanding and agreement and signed consent which can be found in the patient's chart.    Procedure: After obtaining consent, a time-out was performed. Respiratory therapy and nursing at bedside throughout procedure. Patient provided sedation and analgesia throughout the procedure. Placed on full ventilator support with an FiO2 of 100% throughout the procedure. Using a fiberoptic bronchoscope, trachea entered via ETT.  5 mL of local anesthetic sprayed at the yamini (2% lidocaine) achieving appropriate comfort level for patient. Airways visualized directly and the following intervention was performed: Suctioning of mucous and BAL. Findings as below. Patient tolerated procedure well without any difficulties and left in care of bedside nurse/RT.     Medications: Propofol, lidocaine  Findings: Upper airway - Not visualized as bronchoscope passed through ETT.        Trachea to yamini -inflamed appearing mucosa without lesions or mass, ETT tip measured 3 cm from the yamini.        R proximal and distal airways -inflamed, irritated appearing mucosa without mass/lesion/anatomic variance, secretions: Thin, cloudy, mucous in the right lower and right middle lobes. Each bronchi was sequentially irrigated and suctioned until clear.        L proximal and distal airways -inflamed, irritated, friable appearing mucosa. Left mainstem bronchus with extrinsic compression and narrowing, proximal left upper lobe bronchus with obvious endobronchial tumor causing 75% obstruction of the left upper lobe bronchus. This tumor was  partially necrotic and friable. Distally to this mass mild to moderate amount of cloudy mucus was obtained and sent for BAL. All bronchi in the left lung were sequentially irrigated and suctioned until clear.         Samples -left upper lobe BAL    Complications: None  CXR (if applicable): Pending    Madhu Rouse,   Critical Care Medicine

## 2018-02-19 LAB
BACTERIA BRONCH AEROBE CULT: NORMAL
BACTERIA BRONCH AEROBE CULT: NORMAL
GRAM STN SPEC: NORMAL
GRAM STN SPEC: NORMAL
SIGNIFICANT IND 70042: NORMAL
SITE SITE: NORMAL
SOURCE SOURCE: NORMAL

## 2018-02-19 NOTE — DISCHARGE SUMMARY
DEATH SUMMARY                                                                                              Note Author: Lorraine Mcdaniel M.D.       Admit Date:  2/15/2018       Death: 02/18/2018    Service:   [facility] Internal Medicine Gold Team  Attending Physician(s):   Dr Rouse  Senior Resident(s):   Dr Mcdaniel  Rajan Resident(s):   Dr Stiles      Primary Diagnosis:   Altered Mental Status ?Opiod Overdose  Post Obstructive Pneumonia in the setting of lung mass.  Stage IV Metastatic Lung Cancer.Dictation #1  MRN:[id number]  CSN:[id number]      Secondary Diagnoses:                Principal Problem:    Lung cancer metastatic to bone (CMS-HCC) POA: Unknown  Active Problems:    Acute respiratory failure with hypoxia (CMS-HCC) POA: Unknown    Altered mental status POA: Unknown    Sepsis (CMS-[facility]) POA: Unknown    Acute kidney failure (CMS-HCC) POA: Unknown      Overview:           Increased anion gap metabolic acidosis POA: Unknown    Anemia POA: Unknown    Hyperkalemia POA: Unknown    Essential hypertension POA: Unknown      Overview: Hx of Hypertension,      Presented with a BP of 134/65mHg. However BP dropped.      On Lisinopril 20mg and diltaizem             Plan       Hold antihypertensives due to hypotension in the setting of sepsis.          JUANITO (obstructive sleep apnea) POA: Unknown    History of gastroesophageal reflux (GERD) POA: Unknown    Type 2 diabetes mellitus (CMS-[facility]) POA: Unknown  Resolved Problems:    * No resolved hospital problems. *      Hospital Summary (Brief Narrative):        62-year-old gentleman with known past medical history of diabetes, hypertension, GERD and JUANITO and recently diagnosed nonsmall cell lung cancer stage IV with metastatic disease to bones, specifically left pelvis and [facility], has a large left upper lobe mass with right-sided pulmonary nodules presented with Altered Mental status.The patient apparently  has been experiencing severe left hip pain as well as left knee pain, [facility] and prescribed oxycodone, which per daughter only intensified the pain without any alleviation. He presented to the ER with altered mental status requiring mechanical intubation. In the ER, he was found to be tachypnea with RR of 24, ,/65mmHg, saturating in low 90's on 4L of oxygen.ABG showed PH of 7.26, CO2 38.6, PO2 1118, Hco3 17.7  He was then intubated due to worsening respiratory status. Urine drug screen was positive for Opoids, oxycodone and cannabinols, Serum K was 5.6 with no concerning EKG changes. WBC 14.6, AG 15 with MAIKEL(Bun 65 and Cr 3.50).CT chest done showed large upper lobe mass, extending from hilum to chest wall  anterior , measuring 9cm , left hilar and mediastinal adenopathy, compresion of the left lower lobe bronchus. He became hypotensive requiring vasopressors. He was starting empiric treatment given his leukocytosis, probably post obstructive pneumonia in the setting of lung mass. He was initially started on Ceftriaxone and metronidazole and later transitioned to Vancomycin plus cefepime plus flagyl given his worsening respiratory status. His sputum cultures were positive for Klebsiella. Bronchoscopy was performed with BAL showed many WBCs and cultures/gram stains remained negative. CT head was negative for metastatic disease and/or or bleed or masses. EEG done showed no seizure like activity.  Patient condition continued to deteriorate with poor/guarded prognosis and after multiple discussions with the family patient was made comfort care. On 02/18 patient passed away.          Patient /Hospital Summary (Details -- Problem Oriented) :          Acute respiratory failure with hypoxia (CMS-HCC)   Assessment & Plan    -Presented with AMS, on 4L of oxygen, ABG showed PH of 7.26, CO2 38.6, PO2 118, Hco3 17.7.  -He was then intubated following worsening respiratory function function   - CXR  shows prominent left parahilar opacity probably mass-with post obstructive pneumonia? and right basilar opacity(ARDS versus Pneumonia versus Pulmonary edema).   - ARDS in the setting of sepsis:IV lasix 20 mg BID.   - ?Post obstructive Pneumonia with WBC 15K>13k>11.5   - Bronchoscopy done yesterday- gram stain- no organisms. Cultures pending  -RT protocol, duo-nebs, 02 supplementary therapy.     - continue vancomycin, cefepime and flagyl        Anemia   Assessment & Plan    -Acute Anemia:-Drop of Hgb from 11.1>7.6>7.9>10.2>8.7>8.5- kidney dysfunction/metatstatic lung cancer with probably bone marrow suppression versus others. No visible signs of bleed.         Increased anion gap metabolic acidosis   Assessment & Plan    Anion Gap Metabolic Acidosis/lactic Acidosis  -Elevated Anion gap 15>11>9 with bicar 18>15>17  -Etiologies include -MAIKEL versus Infection/Pneumonia.             Acute kidney failure (CMS-HCC)   Assessment & Plan    -Presented with Creatinine of 3.49 and BUN 65.  -FENA <1%., UA negative for infectious process.  -CT abdomen/pelvis shows Edematous appearing LEFT kidney of uncertain significance.  Mass is not excluded.  -Improved with IV fluids from 3.50>2.69>3.17>2.30  -Continue to monitor.   -Avoid NSAIDs, nephrotoxins.           Sepsis (CMS-HCC)   Assessment & Plan    Presented with SOB and altered mental status  SIRS 2/4 , WBC 14.6 and    He became hypotensive which did not respond to fluid bolus and so he was started on pressors, phenylephrine.  UA non concerning for UTI, CXR concerning for probably post obstructive Pneumonia ?   Lactate 1.7 >1.4>2.2  Procalcitonin 0.55  Blood culture x 2 . Follow up.  S/p fluids.    Plan:  -Continue with Vancomycin plus Cefepime and Flagyl            Altered mental status   Assessment & Plan    He was reported to have received some pills from an unknown person following which he became altered, with worsening of his mental state and shortness of breath.  He  was brought to the ED by EMS.  DDx : infective, metabolic, hypoglycemia.  Urine drug screen positive for Opiods, cannabinoids and oxycodone..  He was then intubated due to worsening respiratory function  S/p one dose of Narcan  CT head - no evidence of acute hemorrhage or metastatic disease.            Type 2 diabetes mellitus (CMS-HCC)   Assessment & Plan    -On Glipizide 10mg BID,Insuline glargine 60 units BID, Exenatide.   -HBA1C 6.7    Plan  ISS, accu checks, hypoglycemia protocol.          History of gastroesophageal reflux (GERD)   Assessment & Plan    Patient is on Esomerazole 40mg BID        JUANITO (obstructive sleep apnea)   Assessment & Plan    On CPAP at home        Essential hypertension   Assessment & Plan    Hx of Hypertension,  Presented with a BP of 134/65mHg. However BP dropped.  On Lisinopril 20mg and diltiazem 120mg      Plan   Hold antihypertensives due to hypotension in the setting of sepsis        Hyperkalemia   Assessment & Plan    Resolved  Presented with serum K of 5.8, no EKG changes,  Was given IV calcuim gluconate.  Could be 2/2 lisniopril which patient takes for HTN  will continue to monitor.        * Lung cancer metastatic to bone (CMS-HCC)   Assessment & Plan    Had sudden onset of hoarseness on 12/25/17.  He was worked up, bronchoscopy was done on 01/25/2018.  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  PET Scan done 01/18/2018, : Wide spread soft tissue disease including ROSHNI lung mass, mediastinal adenopathy with liver and osseous mets noted.  [facility]  [facility] head negative for metastatic disease.  CT chest/abdomen/pelvis: 1.  Large LEFT upper lobe lung mass extending from hilum to chest wall anteriorly, measuring approximately 9 cm with probable associated consolidated lung. LEFT hilar and mediastinal adenopathy, metastatic. Apparent compression of LEFT lower lobe bronchus.  Nodules and ill-defined opacity in the RIGHT lung, likely  metastatic.  Edematous appearing LEFT kidney of uncertain significance.  Mass is not excluded.  Limited by lack of IV contrast.  -Metastatic lung cancer, presumed with metastasis to the left pelvis and liver              Consultants:     Pulmonology  ICU      Procedures:        Mechanical Ventilation/Intubation  Bronchoscopy    Imaging/ Testing:      DX-CHEST-PORTABLE (1 VIEW)   Final Result      No significant change      DX-CHEST-PORTABLE (1 VIEW)   Final Result         1. No significant interval change.      DX-CHEST-PORTABLE (1 VIEW)   Final Result      No significant change      [id number] VIEW   Final Result      Enteric tube has been placed and the tip projects over the stomach.      DX-CHEST-PORTABLE (1 VIEW)   Final Result      Prominent right parahilar opacity may represent edema, ARDS, pneumonia.      Mediastinal mass is again seen.      Left basilar opacity may represent atelectasis or consolidation.         CT-HEAD W/O   Final Result      1.  No evidence of intracranial metastatic disease by unenhanced CT; MRI would without and with contrast would be the study of choice to further assessment if clinically appropriate   2.  Mild white matter changes.   3.  No evidence of hemorrhage or large territorial infarction      DX-CHEST-PORTABLE (1 VIEW)   Final Result      No significant change from prior exam.      DX-CHEST-PORTABLE (1 VIEW)   Final Result      1.  Supportive tubing as described above.   2.  No pneumothorax.   3.  No other significant change from prior exam.         [id number] VIEW   Final Result      Orogastric tube tip at the mid stomach.      CT-CHEST,ABDOMEN,PELVIS W/O   Final Result      1.  Large LEFT upper lobe lung mass extending from hilum to chest wall anteriorly, measuring approximately 9 cm with probable associated consolidated lung.   2.  LEFT hilar and mediastinal adenopathy, metastatic.   3.  Apparent compression of LEFT lower lobe bronchus.   4.  Nodules and ill-defined  opacity in the RIGHT lung, likely metastatic.   5.  Edematous appearing LEFT kidney of uncertain significance.  Mass is not excluded.   6.  Limited by lack of IV contrast.      DX-CHEST-PORTABLE (1 VIEW)   Final Result      1.  Interval placement of endotracheal tube and increased inflation.   2.  Persistent hazy opacity LEFT lung with apparent volume loss potentially indicating endobronchial obstruction.   3.  No pneumothorax.      [id number] VIEW   Final Result      Orogastric tube is not visualized.      DX-CHEST-PORTABLE (1 VIEW)   Final Result      1.  Hypoinflation with hazy opacity LEFT hemithorax which may indicate pneumonia and/or pleural effusion.  Superimposed mass is not excluded.   2.  No pneumothorax.   3.  Cardiac contours not well evaluated.

## 2018-02-20 LAB
BACTERIA BLD CULT: NORMAL
BACTERIA BLD CULT: NORMAL
SIGNIFICANT IND 70042: NORMAL
SIGNIFICANT IND 70042: NORMAL
SITE SITE: NORMAL
SITE SITE: NORMAL
SOURCE SOURCE: NORMAL
SOURCE SOURCE: NORMAL

## 2018-02-23 NOTE — DOCUMENTATION QUERY
DOCUMENTATION QUERY    PROVIDERS: Please select “Cosign w/ note”to reply to query.    To better represent the severity of illness of your patient, please review the following information and exercise your independent professional judgment in responding to this query.     Patient presented with sepsis and multi-organ failure including acute respiratory failure and acute kidney injury documented per H&P, Progress notes and Discharge Summary.     Per ICD-10 coding guidelines, severe sepsis can be coded when there is documented evidence of a major organ dysfunction/failure/or shock documented as DUE to sepsis. Coders are required to query if organ failure/dysfunction is not documented or linked to sepsis.     Based upon the clinical findings, risk factors, and treatment, can Sepsis be further specified?     Please specify which applies:    · Severe Sepsis related to:    *acute respiratory failure    *acute kidney injury    *Other related organ dysfunction (please specify)    · Sepsis without related organ dysfunction; No severe sepsis    · Other explanation for clinical findings (please document)   · Unable to determine         The medical record reflects the following:   Clinical Findings Discharge Summary 2/18/18, Resident Lorraine Mcdaniel MD    Active Problems:    Acute respiratory failure with hypoxia    Altered mental status     Sepsis     Acute kidney failure     Increased anion gap metabolic acidosis       Sepsis (CMS-HCC)   Assessment & Plan     Presented with SOB and altered mental status  SIRS 2/4 , WBC 14.6 and    He became hypotensive which did not respond to fluid bolus and so he was started on pressors, phenylephrine.  UA non concerning for UTI, CXR concerning for probably post obstructive Pneumonia ?   Lactate 1.7 >1.4>2.2  Procalcitonin 0.55  Blood culture x 2 . Follow up.  S/p fluids.        Acute kidney failure (CMS-[facility])   Assessment & Plan     -Presented with Creatinine of 3.49 and BUN 65.  -FENA  <1%., UA negative for infectious process.  -CT abdomen/pelvis shows Edematous appearing LEFT kidney of uncertain significance.  Mass is not excluded.  -Improved with IV fluids from 3.50>2.69>3.17>2.30  -Continue to monitor.   -Avoid NSAIDs, nephrotoxins.      Acute respiratory failure with hypoxia (CMS-[facility])   Assessment & Plan     -Presented with AMS, on 4L of oxygen, ABG showed PH of 7.26, CO2 38.6, PO2 118, Hco3 17.7.  -He was then intubated following worsening respiratory function function   - CXR shows prominent left parahilar opacity probably mass-with post obstructive pneumonia? and right basilar opacity(ARDS versus Pneumonia versus Pulmonary edema).   - ARDS in the setting of sepsis:IV lasix 20 mg BID.   - ?Post obstructive Pneumonia with WBC 15K>13k>11.5   - Bronchoscopy done yesterday- gram stain- no organisms. Cultures pending  -RT protocol, duo-nebs, 02 supplementary therapy.     - continue vancomycin, cefepime and flagyl           Treatment Sepsis protocol, RT protocol, duo-nebs, 02 supplementary therapy, central line, BAL    Risk Factors Sepsis, acute respiratory failure, MAIKEL, Metastatic lung cancer of ROSHNI, hypotension, pneumonia, DM type 2, HTN,    Location within medical record  H&P, PN, D/S      Thank you,   [email or web address]

## 2018-03-14 LAB
FUNGUS SPEC CULT: ABNORMAL
FUNGUS SPEC CULT: ABNORMAL
SIGNIFICANT IND 70042: ABNORMAL
SITE SITE: ABNORMAL
SOURCE SOURCE: ABNORMAL

## 2018-04-14 LAB
MYCOBACTERIUM SPEC CULT: NORMAL
RHODAMINE-AURAMINE STN SPEC: NORMAL
RHODAMINE-AURAMINE STN SPEC: NORMAL
SIGNIFICANT IND 70042: NORMAL
SITE SITE: NORMAL
SOURCE SOURCE: NORMAL